# Patient Record
Sex: MALE | Race: WHITE | NOT HISPANIC OR LATINO | URBAN - METROPOLITAN AREA
[De-identification: names, ages, dates, MRNs, and addresses within clinical notes are randomized per-mention and may not be internally consistent; named-entity substitution may affect disease eponyms.]

---

## 2024-07-23 ENCOUNTER — INPATIENT (INPATIENT)
Facility: HOSPITAL | Age: 89
LOS: 7 days | Discharge: NURSING, REHAB OR LONG-TERM CARE | DRG: 864 | End: 2024-07-31
Attending: INTERNAL MEDICINE | Admitting: INTERNAL MEDICINE
Payer: MEDICARE

## 2024-07-23 VITALS
WEIGHT: 205.03 LBS | OXYGEN SATURATION: 94 % | HEART RATE: 76 BPM | TEMPERATURE: 103 F | HEIGHT: 70 IN | DIASTOLIC BLOOD PRESSURE: 74 MMHG | SYSTOLIC BLOOD PRESSURE: 128 MMHG | RESPIRATION RATE: 22 BRPM

## 2024-07-23 DIAGNOSIS — R50.9 FEVER, UNSPECIFIED: ICD-10-CM

## 2024-07-23 LAB
ALBUMIN SERPL ELPH-MCNC: 2.9 G/DL — LOW (ref 3.3–5)
ALP SERPL-CCNC: 83 U/L — SIGNIFICANT CHANGE UP (ref 30–120)
ALT FLD-CCNC: 11 U/L — SIGNIFICANT CHANGE UP (ref 10–60)
ANION GAP SERPL CALC-SCNC: 5 MMOL/L — SIGNIFICANT CHANGE UP (ref 5–17)
APPEARANCE UR: CLEAR — SIGNIFICANT CHANGE UP
APTT BLD: 31.6 SEC — SIGNIFICANT CHANGE UP (ref 24.5–35.6)
AST SERPL-CCNC: 35 U/L — SIGNIFICANT CHANGE UP (ref 10–40)
BASOPHILS # BLD AUTO: 0.04 K/UL — SIGNIFICANT CHANGE UP (ref 0–0.2)
BASOPHILS NFR BLD AUTO: 0.4 % — SIGNIFICANT CHANGE UP (ref 0–2)
BILIRUB SERPL-MCNC: 1 MG/DL — SIGNIFICANT CHANGE UP (ref 0.2–1.2)
BILIRUB UR-MCNC: NEGATIVE — SIGNIFICANT CHANGE UP
BUN SERPL-MCNC: 15 MG/DL — SIGNIFICANT CHANGE UP (ref 7–23)
CALCIUM SERPL-MCNC: 9.7 MG/DL — SIGNIFICANT CHANGE UP (ref 8.4–10.5)
CHLORIDE SERPL-SCNC: 97 MMOL/L — SIGNIFICANT CHANGE UP (ref 96–108)
CO2 SERPL-SCNC: 31 MMOL/L — SIGNIFICANT CHANGE UP (ref 22–31)
COLOR SPEC: YELLOW — SIGNIFICANT CHANGE UP
CREAT SERPL-MCNC: 1.09 MG/DL — SIGNIFICANT CHANGE UP (ref 0.5–1.3)
DIFF PNL FLD: NEGATIVE — SIGNIFICANT CHANGE UP
EGFR: 66 ML/MIN/1.73M2 — SIGNIFICANT CHANGE UP
EOSINOPHIL # BLD AUTO: 0.05 K/UL — SIGNIFICANT CHANGE UP (ref 0–0.5)
EOSINOPHIL NFR BLD AUTO: 0.5 % — SIGNIFICANT CHANGE UP (ref 0–6)
ERYTHROCYTE [SEDIMENTATION RATE] IN BLOOD: 64 MM/HR — HIGH (ref 0–9)
FLUAV AG NPH QL: SIGNIFICANT CHANGE UP
FLUBV AG NPH QL: SIGNIFICANT CHANGE UP
GLUCOSE SERPL-MCNC: 120 MG/DL — HIGH (ref 70–99)
GLUCOSE UR QL: NEGATIVE MG/DL — SIGNIFICANT CHANGE UP
HCT VFR BLD CALC: 42.6 % — SIGNIFICANT CHANGE UP (ref 39–50)
HGB BLD-MCNC: 14.1 G/DL — SIGNIFICANT CHANGE UP (ref 13–17)
IMM GRANULOCYTES NFR BLD AUTO: 0.3 % — SIGNIFICANT CHANGE UP (ref 0–0.9)
INR BLD: 1.19 RATIO — HIGH (ref 0.85–1.18)
KETONES UR-MCNC: NEGATIVE MG/DL — SIGNIFICANT CHANGE UP
LACTATE SERPL-SCNC: 2 MMOL/L — SIGNIFICANT CHANGE UP (ref 0.7–2)
LEUKOCYTE ESTERASE UR-ACNC: NEGATIVE — SIGNIFICANT CHANGE UP
LYMPHOCYTES # BLD AUTO: 0.92 K/UL — LOW (ref 1–3.3)
LYMPHOCYTES # BLD AUTO: 8.3 % — LOW (ref 13–44)
MCHC RBC-ENTMCNC: 29.8 PG — SIGNIFICANT CHANGE UP (ref 27–34)
MCHC RBC-ENTMCNC: 33.1 GM/DL — SIGNIFICANT CHANGE UP (ref 32–36)
MCV RBC AUTO: 90.1 FL — SIGNIFICANT CHANGE UP (ref 80–100)
MONOCYTES # BLD AUTO: 1.15 K/UL — HIGH (ref 0–0.9)
MONOCYTES NFR BLD AUTO: 10.4 % — SIGNIFICANT CHANGE UP (ref 2–14)
NEUTROPHILS # BLD AUTO: 8.88 K/UL — HIGH (ref 1.8–7.4)
NEUTROPHILS NFR BLD AUTO: 80.1 % — HIGH (ref 43–77)
NITRITE UR-MCNC: NEGATIVE — SIGNIFICANT CHANGE UP
NRBC # BLD: 0 /100 WBCS — SIGNIFICANT CHANGE UP (ref 0–0)
NT-PROBNP SERPL-SCNC: 256 PG/ML — SIGNIFICANT CHANGE UP (ref 0–450)
PH UR: 7 — SIGNIFICANT CHANGE UP (ref 5–8)
PLATELET # BLD AUTO: 262 K/UL — SIGNIFICANT CHANGE UP (ref 150–400)
POTASSIUM SERPL-MCNC: 3.9 MMOL/L — SIGNIFICANT CHANGE UP (ref 3.5–5.3)
POTASSIUM SERPL-SCNC: 3.9 MMOL/L — SIGNIFICANT CHANGE UP (ref 3.5–5.3)
PROT SERPL-MCNC: 7.6 G/DL — SIGNIFICANT CHANGE UP (ref 6–8.3)
PROT UR-MCNC: NEGATIVE MG/DL — SIGNIFICANT CHANGE UP
PROTHROM AB SERPL-ACNC: 12.9 SEC — SIGNIFICANT CHANGE UP (ref 9.5–13)
RAPID RVP RESULT: SIGNIFICANT CHANGE UP
RBC # BLD: 4.73 M/UL — SIGNIFICANT CHANGE UP (ref 4.2–5.8)
RBC # FLD: 14 % — SIGNIFICANT CHANGE UP (ref 10.3–14.5)
RSV RNA NPH QL NAA+NON-PROBE: SIGNIFICANT CHANGE UP
SARS-COV-2 RNA SPEC QL NAA+PROBE: SIGNIFICANT CHANGE UP
SARS-COV-2 RNA SPEC QL NAA+PROBE: SIGNIFICANT CHANGE UP
SODIUM SERPL-SCNC: 133 MMOL/L — LOW (ref 135–145)
SP GR SPEC: 1.02 — SIGNIFICANT CHANGE UP (ref 1–1.03)
UROBILINOGEN FLD QL: 1 MG/DL — SIGNIFICANT CHANGE UP (ref 0.2–1)
WBC # BLD: 11.07 K/UL — HIGH (ref 3.8–10.5)
WBC # FLD AUTO: 11.07 K/UL — HIGH (ref 3.8–10.5)

## 2024-07-23 PROCEDURE — 71250 CT THORAX DX C-: CPT | Mod: 26,MC

## 2024-07-23 PROCEDURE — 73562 X-RAY EXAM OF KNEE 3: CPT | Mod: 26,LT

## 2024-07-23 PROCEDURE — 71045 X-RAY EXAM CHEST 1 VIEW: CPT | Mod: 26

## 2024-07-23 PROCEDURE — 73700 CT LOWER EXTREMITY W/O DYE: CPT | Mod: 26,LT,MC

## 2024-07-23 PROCEDURE — 93010 ELECTROCARDIOGRAM REPORT: CPT

## 2024-07-23 PROCEDURE — 99285 EMERGENCY DEPT VISIT HI MDM: CPT | Mod: FS

## 2024-07-23 RX ORDER — VANCOMYCIN HYDROCHLORIDE 5 G/100ML
1000 INJECTION, POWDER, LYOPHILIZED, FOR SOLUTION INTRAVENOUS ONCE
Refills: 0 | Status: COMPLETED | OUTPATIENT
Start: 2024-07-23 | End: 2024-07-23

## 2024-07-23 RX ORDER — PIPERACILLIN SODIUM, TAZOBACTAM SODIUM 3; .375 G/15ML; G/15ML
3.38 INJECTION, POWDER, LYOPHILIZED, FOR SOLUTION INTRAVENOUS ONCE
Refills: 0 | Status: COMPLETED | OUTPATIENT
Start: 2024-07-23 | End: 2024-07-23

## 2024-07-23 RX ORDER — ACETAMINOPHEN 500 MG
1000 TABLET ORAL ONCE
Refills: 0 | Status: COMPLETED | OUTPATIENT
Start: 2024-07-23 | End: 2024-07-23

## 2024-07-23 RX ADMIN — Medication 400 MILLIGRAM(S): at 18:01

## 2024-07-23 RX ADMIN — PIPERACILLIN SODIUM, TAZOBACTAM SODIUM 3.38 GRAM(S): 3; .375 INJECTION, POWDER, LYOPHILIZED, FOR SOLUTION INTRAVENOUS at 21:50

## 2024-07-23 RX ADMIN — VANCOMYCIN HYDROCHLORIDE 250 MILLIGRAM(S): 5 INJECTION, POWDER, LYOPHILIZED, FOR SOLUTION INTRAVENOUS at 22:40

## 2024-07-23 RX ADMIN — PIPERACILLIN SODIUM, TAZOBACTAM SODIUM 200 GRAM(S): 3; .375 INJECTION, POWDER, LYOPHILIZED, FOR SOLUTION INTRAVENOUS at 21:07

## 2024-07-23 RX ADMIN — Medication 1000 MILLIGRAM(S): at 19:00

## 2024-07-23 NOTE — ED PROVIDER NOTE - OBJECTIVE STATEMENT
89-year-old male brought in by ambulance from [facility] due to left knee pain and swelling.  Patient was found to have a fever in the ER.  Patient notes he has chronically pain to the left knee.  Patient reports experiencing a cough.  Patient denies numbness, weakness, chest pain, shortness of breath, vomiting, diarrhea, or any other complaints.

## 2024-07-23 NOTE — ED ADULT TRIAGE NOTE - CHIEF COMPLAINT QUOTE
sent in for left knee pain and swelling but pt states has had it for while. pt weak and having trouble ambulating left knee swollen not red. denies sore throat. denies n/v/d. c/o slight cough

## 2024-07-23 NOTE — ED ADULT NURSE REASSESSMENT NOTE - NS ED NURSE REASSESS COMMENT FT1
Left knee tapped by Dr. Pace. Samples obtained and specimens walked to the lab by ED attending Dr. Garcia. Pt tolerated procedure well. Band-aid to site.
Pt out of bed to urinate, is confused, attempted to reorient pt.

## 2024-07-23 NOTE — ED PROVIDER NOTE - CLINICAL SUMMARY MEDICAL DECISION MAKING FREE TEXT BOX
Patient is an 89-year-old male who presents to the emergency room for flulike symptoms.  Past medical history of dementia hypothyroidism hypertension hyperlipidemia..  History is limited secondary to patient's dementia.  Per reports he presents with cough and left knee swelling.  He was found to be febrile on arrival.  On exam patient is lying in bed awake alert to person aware he is in hospital confused to year present events.  Normocephalic atraumatic pupils equal round and reactive heart is regular rate lungs clear to auscultation but does have a scattered wheeze that clears after a coughing episode.  Abdomen soft nontender nondistended.  Left knee effusion noted with no overlying cellulitis or increased warmth.  Patient is able to fully flex and extend knee but does report pain on flexion.  Sensation grossly intact bilateral lower extremities positive pedal pulses cap refill less than 2 seconds.  Patient is presenting to the emergency room febrile with a cough and left knee swelling.  Patient has full range of motion of the knee with no overlying cellulitis or suspicion for septic joint.  Will obtain screening septic workup check viral swab obtain imaging of the chest and knee begin antibiotics as indicated and monitor.  Ultimate clinical disposition will be pending results. Results of labs reviewed patient with a mild leukocytosis with elevated ESR.  CMP noted.  Lactate within normal.  Urine does not appear to be infected viral panel negative.  Independent review of EKG reveals a sinus rhythm with a first-degree AV block at a rate of 76 bpm.  Independent review of chest x-ray reveals no acute infiltrate.  Independent review of left knee x-ray reveals a suprapatellar effusion.  Independent review of CT imaging of the chest reveals no acute infiltrate.  Independent review of CT imaging of the knee reveals no acute fracture or large knee effusion.  As there is no other source of infection orthopedics was consulted.  Orthopedics team examined patient perform tap at bedside.  Fluid sent for results.  Antibiotics ordered.

## 2024-07-23 NOTE — ED PROVIDER NOTE - DIFFERENTIAL DIAGNOSIS
Differential Diagnosis Patient is presenting to the emergency room febrile with a cough and left knee swelling.  Patient has full range of motion of the knee with no overlying cellulitis or suspicion for septic joint.  Will obtain screening septic workup check viral swab obtain imaging of the chest and knee begin antibiotics as indicated and monitor.  Ultimate clinical disposition will be pending results.

## 2024-07-23 NOTE — ED ADULT NURSE NOTE - NSFALLHARMRISKINTERV_ED_ALL_ED

## 2024-07-23 NOTE — ED PROVIDER NOTE - WHICH SHOWED
Independent review of EKG reveals a sinus rhythm with a first-degree AV block at a rate of 76 bpm.  Independent review of chest x-ray reveals no acute infiltrate.  Independent review of left knee x-ray reveals a suprapatellar effusion.  Independent review of CT imaging of the chest reveals no acute infiltrate.  Independent review of CT imaging of the knee reveals no acute fracture or large knee effusion.

## 2024-07-23 NOTE — ED PROVIDER NOTE - WR ORDER ID 2
[facility] Pregnancy And Lactation Text: There is insufficient data to evaluate drug-associated risk for major birth defects, miscarriage, or other adverse maternal or fetal outcomes.  There is a pregnancy registry that monitors pregnancy outcomes in pregnant persons exposed to the medication during pregnancy.  It is unknown if this medication is excreted in breast milk.  Do not breastfeed during treatment and for about 4 weeks after the last dose. [id number]

## 2024-07-23 NOTE — ED ADULT NURSE REASSESSMENT NOTE - NSFALLHARMRISKINTERV_ED_ALL_ED
Assistance OOB with selected safe patient handling equipment if applicable/Assistance with ambulation/Communicate risk of Fall with Harm to all staff, patient, and family/Monitor gait and stability/Monitor for mental status changes and reorient to person, place, and time, as needed/Move patient closer to nursing station/within visual sight of ED staff/Provide visual cue: red socks, yellow wristband, yellow gown, etc/Reinforce activity limits and safety measures with patient and family/Toileting schedule using arm’s reach rule for commode and bathroom/Use of alarms - bed, stretcher, chair and/or video monitoring/Bed in lowest position, wheels locked, appropriate side rails in place/Call bell, personal items and telephone in reach/Instruct patient to call for assistance before getting out of bed/chair/stretcher/Non-slip footwear applied when patient is off stretcher/[place] to call system/Physically safe environment - no spills, clutter or unnecessary equipment/Purposeful Proactive Rounding/Room/bathroom lighting operational, light cord in reach

## 2024-07-24 DIAGNOSIS — E03.9 HYPOTHYROIDISM, UNSPECIFIED: ICD-10-CM

## 2024-07-24 DIAGNOSIS — Z29.9 ENCOUNTER FOR PROPHYLACTIC MEASURES, UNSPECIFIED: ICD-10-CM

## 2024-07-24 DIAGNOSIS — I10 ESSENTIAL (PRIMARY) HYPERTENSION: ICD-10-CM

## 2024-07-24 DIAGNOSIS — E78.5 HYPERLIPIDEMIA, UNSPECIFIED: ICD-10-CM

## 2024-07-24 DIAGNOSIS — A41.9 SEPSIS, UNSPECIFIED ORGANISM: ICD-10-CM

## 2024-07-24 DIAGNOSIS — M00.9 PYOGENIC ARTHRITIS, UNSPECIFIED: ICD-10-CM

## 2024-07-24 LAB
ANION GAP SERPL CALC-SCNC: 7 MMOL/L — SIGNIFICANT CHANGE UP (ref 5–17)
B PERT IGG+IGM PNL SER: ABNORMAL
BASOPHILS # BLD AUTO: 0.03 K/UL — SIGNIFICANT CHANGE UP (ref 0–0.2)
BASOPHILS NFR BLD AUTO: 0.3 % — SIGNIFICANT CHANGE UP (ref 0–2)
BUN SERPL-MCNC: 16 MG/DL — SIGNIFICANT CHANGE UP (ref 7–23)
CALCIUM SERPL-MCNC: 9.7 MG/DL — SIGNIFICANT CHANGE UP (ref 8.4–10.5)
CHLORIDE SERPL-SCNC: 97 MMOL/L — SIGNIFICANT CHANGE UP (ref 96–108)
CO2 SERPL-SCNC: 30 MMOL/L — SIGNIFICANT CHANGE UP (ref 22–31)
COLOR FLD: YELLOW — SIGNIFICANT CHANGE UP
CREAT SERPL-MCNC: 0.97 MG/DL — SIGNIFICANT CHANGE UP (ref 0.5–1.3)
CRP SERPL-MCNC: 95 MG/L — HIGH
EGFR: 76 ML/MIN/1.73M2 — SIGNIFICANT CHANGE UP
EOSINOPHIL # BLD AUTO: 0.16 K/UL — SIGNIFICANT CHANGE UP (ref 0–0.5)
EOSINOPHIL NFR BLD AUTO: 1.4 % — SIGNIFICANT CHANGE UP (ref 0–6)
FLUID INTAKE SUBSTANCE CLASS: SIGNIFICANT CHANGE UP
GLUCOSE SERPL-MCNC: 107 MG/DL — HIGH (ref 70–99)
HCT VFR BLD CALC: 45.8 % — SIGNIFICANT CHANGE UP (ref 39–50)
HGB BLD-MCNC: 15 G/DL — SIGNIFICANT CHANGE UP (ref 13–17)
IMM GRANULOCYTES NFR BLD AUTO: 0.4 % — SIGNIFICANT CHANGE UP (ref 0–0.9)
LYMPHOCYTES # BLD AUTO: 1.34 K/UL — SIGNIFICANT CHANGE UP (ref 1–3.3)
LYMPHOCYTES # BLD AUTO: 11.8 % — LOW (ref 13–44)
LYMPHOCYTES # FLD: 1 % — SIGNIFICANT CHANGE UP
MCHC RBC-ENTMCNC: 29.5 PG — SIGNIFICANT CHANGE UP (ref 27–34)
MCHC RBC-ENTMCNC: 32.8 GM/DL — SIGNIFICANT CHANGE UP (ref 32–36)
MCV RBC AUTO: 90 FL — SIGNIFICANT CHANGE UP (ref 80–100)
MONOCYTES # BLD AUTO: 1.27 K/UL — HIGH (ref 0–0.9)
MONOCYTES NFR BLD AUTO: 11.2 % — SIGNIFICANT CHANGE UP (ref 2–14)
MONOS+MACROS # FLD: 39 % — SIGNIFICANT CHANGE UP
NEUTROPHILS # BLD AUTO: 8.49 K/UL — HIGH (ref 1.8–7.4)
NEUTROPHILS NFR BLD AUTO: 74.9 % — SIGNIFICANT CHANGE UP (ref 43–77)
NEUTROPHILS-BODY FLUID: 60 % — SIGNIFICANT CHANGE UP
NRBC # BLD: 0 /100 WBCS — SIGNIFICANT CHANGE UP (ref 0–0)
PLATELET # BLD AUTO: 252 K/UL — SIGNIFICANT CHANGE UP (ref 150–400)
POTASSIUM SERPL-MCNC: 3.5 MMOL/L — SIGNIFICANT CHANGE UP (ref 3.5–5.3)
POTASSIUM SERPL-SCNC: 3.5 MMOL/L — SIGNIFICANT CHANGE UP (ref 3.5–5.3)
RBC # BLD: 5.09 M/UL — SIGNIFICANT CHANGE UP (ref 4.2–5.8)
RBC # FLD: 13.9 % — SIGNIFICANT CHANGE UP (ref 10.3–14.5)
RCV VOL RI: HIGH /UL (ref 0–0)
SODIUM SERPL-SCNC: 134 MMOL/L — LOW (ref 135–145)
SYNOVIAL CRYSTALS CLARITY: ABNORMAL
SYNOVIAL CRYSTALS COLOR: YELLOW
SYNOVIAL CRYSTALS ID: ABNORMAL
SYNOVIAL CRYSTALS TUBE: SIGNIFICANT CHANGE UP
TOTAL NUCLEATED CELL COUNT, BODY FLUID: SIGNIFICANT CHANGE UP /UL
TUBE TYPE: SIGNIFICANT CHANGE UP
WBC # BLD: 11.34 K/UL — HIGH (ref 3.8–10.5)
WBC # FLD AUTO: 11.34 K/UL — HIGH (ref 3.8–10.5)

## 2024-07-24 PROCEDURE — 99223 1ST HOSP IP/OBS HIGH 75: CPT | Mod: AI

## 2024-07-24 RX ORDER — VANCOMYCIN HYDROCHLORIDE 5 G/100ML
1750 INJECTION, POWDER, LYOPHILIZED, FOR SOLUTION INTRAVENOUS EVERY 12 HOURS
Refills: 0 | Status: DISCONTINUED | OUTPATIENT
Start: 2024-07-24 | End: 2024-07-24

## 2024-07-24 RX ORDER — ACETAMINOPHEN 500 MG
650 TABLET ORAL EVERY 6 HOURS
Refills: 0 | Status: DISCONTINUED | OUTPATIENT
Start: 2024-07-24 | End: 2024-07-31

## 2024-07-24 RX ORDER — METOPROLOL TARTRATE 100 MG
50 TABLET ORAL DAILY
Refills: 0 | Status: DISCONTINUED | OUTPATIENT
Start: 2024-07-24 | End: 2024-07-30

## 2024-07-24 RX ORDER — LEVOTHYROXINE SODIUM 175 MCG
1 TABLET ORAL
Refills: 0 | DISCHARGE

## 2024-07-24 RX ORDER — ENOXAPARIN SODIUM 120 MG/.8ML
40 INJECTION SUBCUTANEOUS EVERY 24 HOURS
Refills: 0 | Status: DISCONTINUED | OUTPATIENT
Start: 2024-07-24 | End: 2024-07-31

## 2024-07-24 RX ORDER — GUAIFENESIN 100 MG/5ML
200 SOLUTION ORAL EVERY 6 HOURS
Refills: 0 | Status: DISCONTINUED | OUTPATIENT
Start: 2024-07-24 | End: 2024-07-31

## 2024-07-24 RX ORDER — LEVOTHYROXINE SODIUM 175 MCG
25 TABLET ORAL DAILY
Refills: 0 | Status: DISCONTINUED | OUTPATIENT
Start: 2024-07-24 | End: 2024-07-31

## 2024-07-24 RX ADMIN — Medication 20 MILLIGRAM(S): at 21:58

## 2024-07-24 RX ADMIN — ENOXAPARIN SODIUM 40 MILLIGRAM(S): 120 INJECTION SUBCUTANEOUS at 12:49

## 2024-07-24 RX ADMIN — VANCOMYCIN HYDROCHLORIDE 250 MILLIGRAM(S): 5 INJECTION, POWDER, LYOPHILIZED, FOR SOLUTION INTRAVENOUS at 09:33

## 2024-07-24 RX ADMIN — Medication 100 MILLIGRAM(S): at 06:25

## 2024-07-24 NOTE — H&P ADULT - NSHPPHYSICALEXAM_GEN_ALL_CORE
-    Vital Signs Last 24 Hrs  T(C): 36.8 (24 Jul 2024 06:00), Max: 39.6 (23 Jul 2024 16:21)  T(F): 98.3 (24 Jul 2024 06:00), Max: 103.2 (23 Jul 2024 16:21)  HR: 61 (24 Jul 2024 06:00) (57 - 76)  BP: 119/68 (24 Jul 2024 06:00) (103/59 - 128/74)  BP(mean): --  RR: 18 (24 Jul 2024 06:00) (18 - 22)  SpO2: 94% (24 Jul 2024 06:00) (94% - 94%)    Parameters below as of 24 Jul 2024 06:00  Patient On (Oxygen Delivery Method): room air        PHYSICAL EXAM:  		  GENERAL: NAD, well-groomed, well-developed.  HEAD:  Atraumatic, Norm cephalic.  EYES: PERRLA, conjunctiva clear.  ENMT: no nasal discharge, MMM.   NECK: Supple, No JVD.  NERVOUS SYSTEM:  Alert & oriented X2, neurologically intact grossly.  CHEST/LUNG: Fair air entry B/L, no rales, rhonchi, or wheezing.  HEART: Normal S1 & S2, no murmurs, or extra sounds.  ABDOMEN: Soft, non-tender, non-distended; bowel sounds present, no palpable masses or organomegaly.  EXTREMITIES:  No clubbing, cyanosis, or edema, mildly swollen left knee without erythema or warmth, (+) fluctuation, right knee is normal.  VASCULAR: 2+ radial, DPA / PTA pulses B/L.  SKIN: No rashes or lesions.  PSYCH: normal affect & behavior.

## 2024-07-24 NOTE — PROGRESS NOTE ADULT - SUBJECTIVE AND OBJECTIVE BOX
Date/Time Patient Seen:  		  Referring MD:   Data Reviewed	       Patient is a 89y old  Male who presents with a chief complaint of     Subjective/HPI     PAST MEDICAL & SURGICAL HISTORY:        Medication list         MEDICATIONS  (STANDING):  cefTRIAXone   IVPB      vancomycin  IVPB 1750 milliGRAM(s) IV Intermittent every 12 hours    MEDICATIONS  (PRN):         Vitals log        ICU Vital Signs Last 24 Hrs  T(C): 36.7 (23 Jul 2024 23:45), Max: 39.6 (23 Jul 2024 16:21)  T(F): 98.1 (23 Jul 2024 23:45), Max: 103.2 (23 Jul 2024 16:21)  HR: 57 (23 Jul 2024 23:45) (57 - 76)  BP: 103/59 (23 Jul 2024 23:45) (103/59 - 128/74)  BP(mean): --  ABP: --  ABP(mean): --  RR: 18 (23 Jul 2024 23:45) (18 - 22)  SpO2: 94% (23 Jul 2024 23:45) (94% - 94%)    O2 Parameters below as of 23 Jul 2024 23:45  Patient On (Oxygen Delivery Method): room air                 Input and Output:  I&O's Detail      Lab Data                        14.1   11.07 )-----------( 262      ( 23 Jul 2024 16:58 )             42.6     07-23    133<L>  |  97  |  15  ----------------------------<  120<H>  3.9   |  31  |  1.09    Ca    9.7      23 Jul 2024 16:58    TPro  7.6  /  Alb  2.9<L>  /  TBili  1.0  /  DBili  x   /  AST  35  /  ALT  11  /  AlkPhos  83  07-23            Review of Systems	      Objective     Physical Examination    heart s1s2  lung dc BS  head nc      Pertinent Lab findings & Imaging      Aj:  NO   Adequate UO     I&O's Detail           Discussed with:     Cultures:	        Radiology

## 2024-07-24 NOTE — H&P ADULT - ASSESSMENT
88 y/o M with PMH of HTN, Dyslipidemia, and Hypothyroidism presented with left knee swelling & pain associated with generalized weakness.

## 2024-07-24 NOTE — CARE COORDINATION ASSESSMENT. - MET/SPOKE WITH
son Mauro and Alize from [facility] [phone]/patient/son/caregiver

## 2024-07-24 NOTE — H&P ADULT - PROBLEM SELECTOR PLAN 2
ML 2ry to the above, normal serum lactic acid level, empirical antibiotic therapy as stated above, monitor I & O, ID consulted.

## 2024-07-24 NOTE — H&P ADULT - PROBLEM SELECTOR PLAN 1
seen by ortho at the ED, arthrocentesis was performed, was started empirically on Vancomycin & Zosyn after cultures were obtained by ED team, switched to Ceftriaxone & Vancomycin, monitor Vancomycin trough level, pain control, Tylenol PRN for fever, ID consult with Dr. Contreras was called, f/u synovial aspirate w/u results.

## 2024-07-24 NOTE — H&P ADULT - HISTORY OF PRESENT ILLNESS
This is an 89 y/o M with PMH of HTN, Dyslipidemia, and Hypothyroidism who presented from [facility] with left knee swelling & pain associated with generalized weakness. No reported fever or chills prior to presentation, denies knee trauma or falls, states that he has the above complaints "for a while". At the ED he was found with fever of 103.2 F with evidence of sepsis, seen by Ortho, an arthrocentesis was performed, and patient was started on antibiotics. Currently denies any pain.

## 2024-07-24 NOTE — CONSULT NOTE ADULT - ASSESSMENT
89-year-old male brought in by ambulance from [facility] due to left knee pain and swelling.  Patient was found to have a fever in the ER.  Patient notes he has chronically pain to the left knee.  Patient reports experiencing a cough.
The patient is an 89 year old male with a history of HTN, HL, hypothyroidism who presents with shortness of breath, cough.    Plan:  - ECG with sinus rhythm and atrial ectopy  - CT chest with no PNA  -   - Continue metoprolol succinate 50 mg daily  - Continue simvastatin 20 mg daily  - Hold triamterene-HCTZ for now  - Knee aspiration results pending  - ID eval
XR negative apart from arthritic changes.     No source of fever. I performed a L knee aspiration under sterile conditions. He tolerated this well. 40 cc of turbid yellow fluid. No magen pus. Send for cell count, c&s, crystals.         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   PT/OT to mobilize    Please page me if any acute changes.

## 2024-07-24 NOTE — CARE COORDINATION ASSESSMENT. - NSCAREPROVIDERS_GEN_ALL_CORE_FT
CARE PROVIDERS:  Accepting Physician: Ej Ingram  Administration: Lit Gibbs  Administration: Alfredo Perrin  Admitting: Ej Ingram  Attending: Ej Ingram  Case Management: Cassandra Kearns  Case Management: Eryn Gonzalez  Consultant: Holly Contreras  Consultant: Ok Metz  Consultant: Raghavendra Delnua  Consultant: Nirmal Andrade  ED ACP: Gallo Griffin  ED Attending: Elizabeth Garcia  ED Nurse: Ashwin Bañuelos  Infection Control: Shara Elizabeth  Nurse: Liliana Zabala  Nurse: Ashwin Bañuelos  Ordered: Physician, Ordering  Override: Liliana Zabala  Primary Team: Miriam Hamm  Primary Team: Tori Granados  Registered Dietitian: Miriam Zarate  Team: [facility] Hospitalists, Team

## 2024-07-24 NOTE — PATIENT PROFILE ADULT - FUNCTIONAL ASSESSMENT - BASIC MOBILITY 6.
3-calculated by average/Not able to assess (calculate score using [facility] averaging method)

## 2024-07-24 NOTE — H&P ADULT - PROBLEM SELECTOR PLAN 4
controlled on the lower side, hold Triamterene/ HCTZ for now, continue Metoprolol succinate with hold parameters.

## 2024-07-24 NOTE — PROGRESS NOTE ADULT - ASSESSMENT
89-year-old male brought in by ambulance from [facility] due to left knee pain and swelling.  Patient was found to have a fever in the ER.  Patient notes he has chronically pain to the left knee.  Patient reports experiencing a cough.    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

## 2024-07-24 NOTE — CONSULT NOTE ADULT - SUBJECTIVE AND OBJECTIVE BOX
History of Present Illness: The patient is an 89 year old male with a history of HTN, HL, hypothyroidism who presents with shortness of breath, cough. This has been present for the last few weeks. He also noted left knee swelling and pain. He was found to be febrile to 103F in ED.    Past Medical/Surgical History:  HTN, HL, hypothyroidism     Medications:  Home Medications:  Levothroid 25 mcg (0.025 mg) oral tablet: 1 tab(s) orally once a day (24 Jul 2024 02:42)  Metoprolol Succinate ER 50 mg oral tablet, extended release: 1 tab(s) orally once a day (24 Jul 2024 02:42)  simvastatin 20 mg oral tablet: 1 tab(s) orally once a day (at bedtime) (24 Jul 2024 02:42)  triamterene-hydrochlorothiazide 37.5 mg-25 mg oral capsule: 1 cap(s) orally once a day (24 Jul 2024 02:43)      Family History: Non-contributory family history of premature cardiovascular atherosclerotic disease    Social History: No tobacco, alcohol or drug use    Review of Systems:  General: No fevers, chills, weight gain  Skin: No rashes, color changes  Cardiovascular: No chest pain, orthopnea  Respiratory: +shortness of breath, cough  Gastrointestinal: No nausea, abdominal pain  Genitourinary: No incontinence, pain with urination  Musculoskeletal: +pain, swelling, decreased range of motion  Neurological: No headache, weakness  Psychiatric: No depression, anxiety  Endocrine: No weight gain, increased thirst  All other systems are comprehensively negative.    Physical Exam:  Vitals:        Vital Signs Last 24 Hrs  T(C): 37.1 (24 Jul 2024 08:06), Max: 39.6 (23 Jul 2024 16:21)  T(F): 98.7 (24 Jul 2024 08:06), Max: 103.2 (23 Jul 2024 16:21)  HR: 60 (24 Jul 2024 08:06) (57 - 76)  BP: 143/70 (24 Jul 2024 08:06) (103/59 - 143/70)  BP(mean): --  RR: 18 (24 Jul 2024 08:06) (18 - 22)  SpO2: 99% (24 Jul 2024 08:06) (94% - 99%)  Parameters below as of 24 Jul 2024 08:06  Patient On (Oxygen Delivery Method): room air  General: NAD  HEENT: MMM  Neck: No JVD, no carotid bruit  Lungs: CTAB  CV: RRR, nl S1/S2, no M/R/G  Abdomen: S/NT/ND, +BS  Extremities: No LE edema, no cyanosis  Neuro: AAOx3, non-focal  Skin: No rash    Labs:                        14.1   11.07 )-----------( 262      ( 23 Jul 2024 16:58 )             42.6     07-23    133<L>  |  97  |  15  ----------------------------<  120<H>  3.9   |  31  |  1.09    Ca    9.7      23 Jul 2024 16:58    TPro  7.6  /  Alb  2.9<L>  /  TBili  1.0  /  DBili  x   /  AST  35  /  ALT  11  /  AlkPhos  83  07-23        PT/INR - ( 23 Jul 2024 16:58 )   PT: 12.9 sec;   INR: 1.19 ratio         PTT - ( 23 Jul 2024 16:58 )  PTT:31.6 sec    ECG/Telemetry: NSR, 1st deg AVB, PACs, normal axis, no ST abnormality    
89-year-old male brought in by ambulance from [facility] due to left knee pain and swelling.  Patient was found to have a fever in the ER.  Patient notes he has chronically pain to the left knee.  Patient reports experiencing a cough.  Patient denies numbness, weakness, chest pain, shortness of breath, vomiting, diarrhea, or any other complaints.      L knee effusion. tolerating AROM and PROM with slight pain. No tenderness. Tolerating axial loading but antalgic gait. NVI. No major skin changes  
Date/Time Patient Seen:  		  Referring MD:   Data Reviewed	       Patient is a 89y old  Male who presents with a chief complaint of     Subjective/HPI   89-year-old male brought in by ambulance from [facility] due to left knee pain and swelling.  Patient was found to have a fever in the ER.  Patient notes he has chronically pain to the left knee.  Patient reports experiencing a cough.  PAST MEDICAL & SURGICAL HISTORY:        Medication list         MEDICATIONS  (STANDING):    MEDICATIONS  (PRN):         Vitals log        ICU Vital Signs Last 24 Hrs  T(C): 39.6 (23 Jul 2024 16:21), Max: 39.6 (23 Jul 2024 16:21)  T(F): 103.2 (23 Jul 2024 16:21), Max: 103.2 (23 Jul 2024 16:21)  HR: 76 (23 Jul 2024 16:21) (76 - 76)  BP: 128/74 (23 Jul 2024 16:21) (128/74 - 128/74)  BP(mean): --  ABP: --  ABP(mean): --  RR: 22 (23 Jul 2024 16:21) (22 - 22)  SpO2: 94% (23 Jul 2024 16:21) (94% - 94%)    O2 Parameters below as of 23 Jul 2024 16:21  Patient On (Oxygen Delivery Method): room air         HIV:    HIV Test Questions:  · In accordance with [place] State law, we offer every patient who comes to our ED an HIV test. Would you like to be tested today?	Opt out    HEPATITIS C TEST QUESTIONS:    Hepatitis C Test Questions:  · In accordance with [place] State Law, we offer every patient a Hepatitis C test. Would you like to be tested today?	Opt out    PAST MEDICAL/SURGICAL/FAMILY/SOCIAL HISTORY:    Tobacco Usage:  · Tobacco Usage	Unknown if ever smoked    ALLERGIES AND HOME MEDICATIONS:   Allergies:        Allergies:  	No Known Allergies:     Home Medications:   * Outpatient Medication Status not yet specified  REVIEW OF SYSTEMS:    Review of Systems:  · CONSTITUTIONAL: no fever and no chills.  · MUSCULOSKELETAL: - - -  · Musculoskeletal [+]: JOINT PAIN  · Musculoskeletal [-]: no back pain, no calf pain, no limited range of motion, no neck pain  · SKIN: no abrasions, no jaundice, no lesions, no pruritis, and no rashes.  · NEURO: no loss of consciousness, no gait abnormality, no headache, no sensory deficits, and no weakness.  · ROS STATEMENT: all other ROS negative except as per HPI          Input and Output:  I&O's Detail      Lab Data                        14.1   11.07 )-----------( 262      ( 23 Jul 2024 16:58 )             42.6     07-23    133<L>  |  97  |  15  ----------------------------<  120<H>  3.9   |  31  |  1.09    Ca    9.7      23 Jul 2024 16:58    TPro  7.6  /  Alb  2.9<L>  /  TBili  1.0  /  DBili  x   /  AST  35  /  ALT  11  /  AlkPhos  83  07-23            Review of Systems	  cough  weakness  pain      Objective     Physical Examination    heart s1s2  lung dc bS  head nc  verbal  cn grossly int  weak  frail  elderly      Pertinent Lab findings & Imaging      Aj:  NO   Adequate UO     I&O's Detail           Discussed with:     Cultures:	        Radiology                            
  HPI:  88YO M PMH of HTN, Dyslipidemia, and Hypothyroidism resident of [facility] who presented to the hospital with c/o left knee swelling & pain associated with generalized weakness. No trauma or fall . Denies fever chills. Denies difficulty walking.  In ED he was found with fever of 103.2 F. Seen by Ortho, an arthrocentesis was performed.    Infectious Disease consult was called to evaluate pt and for antibiotic management.      Past Medical & Surgical Hx:  PAST MEDICAL & SURGICAL HISTORY:  No pertinent past medical history    No significant past surgical history      Social History--  EtOH: denies   Tobacco: denies   Drug Use: denies     FAMILY HISTORY:  Noncontributory    Allergies  No Known Allergies    Intolerances  NONE      Home Medications:  Levothroid 25 mcg (0.025 mg) oral tablet: 1 tab(s) orally once a day (24 Jul 2024 02:42)  Metoprolol Succinate ER 50 mg oral tablet, extended release: 1 tab(s) orally once a day (24 Jul 2024 02:42)  simvastatin 20 mg oral tablet: 1 tab(s) orally once a day (at bedtime) (24 Jul 2024 02:42)  triamterene-hydrochlorothiazide 37.5 mg-25 mg oral capsule: 1 cap(s) orally once a day (24 Jul 2024 02:43)    Current Inpatient Medications :    ANTIBIOTICS:   cefTRIAXone   IVPB      vancomycin  IVPB 1750 milliGRAM(s) IV Intermittent every 12 hours      OTHER RELEVANT MEDICATIONS :  acetaminophen     Tablet .. 650 milliGRAM(s) Oral every 6 hours PRN  enoxaparin Injectable 40 milliGRAM(s) SubCutaneous every 24 hours  guaiFENesin Oral Liquid (Sugar-Free) 200 milliGRAM(s) Oral every 6 hours PRN  levothyroxine 25 MICROGram(s) Oral daily  metoprolol succinate ER 50 milliGRAM(s) Oral daily  simvastatin 20 milliGRAM(s) Oral at bedtime      ROS:  CONSTITUTIONAL: + fever or chills  EYES:  Negative  blurry vision or double vision  CARDIOVASCULAR:  Negative for chest pain or palpitations  RESPIRATORY:  Negative for cough, wheezing, or SOB   GASTROINTESTINAL:  Negative for nausea, vomiting, diarrhea, constipation, or abdominal pain  GENITOURINARY:  Negative frequency, urgency , dysuria or hematuria   NEUROLOGIC:  No headache, confusion, dizziness, lightheadedness  All other systems were reviewed and are negative    Physical Exam:  Vital Signs Last 24 Hrs  T(C): 37.3 (24 Jul 2024 13:34), Max: 39.6 (23 Jul 2024 16:21)  T(F): 99.1 (24 Jul 2024 13:34), Max: 103.2 (23 Jul 2024 16:21)  HR: 82 (24 Jul 2024 13:34) (57 - 82)  BP: 128/66 (24 Jul 2024 13:34) (103/59 - 143/70)  RR: 18 (24 Jul 2024 13:34) (18 - 22)  SpO2: 95% (24 Jul 2024 13:34) (94% - 99%)    Parameters below as of 24 Jul 2024 13:34  Patient On (Oxygen Delivery Method): room air      Height (cm): 177.8 (07-23 @ 16:21)  Weight (kg): 93 (07-23 @ 16:21)  BMI (kg/m2): 29.4 (07-23 @ 16:21)  BSA (m2): 2.11 (07-23 @ 16:21)    General: well developed well nourished, in no acute distress  Neck: supple, trachea midline  Lungs: clear, no wheeze/rhonchi  Cardiovascular: regular rate and rhythm, S1 S2  Abdomen: soft, nontender, ND, bowel sounds normal  Neurological:  alert and oriented x3  Skin: no rash  Extremities: Left knee swelling no erythema no pain + FROM    Labs:                           15.0   11.34 )-----------( 252      ( 24 Jul 2024 11:44 )             45.8   07-24    134<L>  |  97  |  16  ----------------------------<  107<H>  3.5   |  30  |  0.97    Ca    9.7      24 Jul 2024 11:44    TPro  7.6  /  Alb  2.9<L>  /  TBili  1.0  /  DBili  x   /  AST  35  /  ALT  11  /  AlkPhos  83  07-23    RECENT CULTURES:          RADIOLOGY & ADDITIONAL STUDIES:    ACC: [id number] EXAM:  CT KNEE ONLY LT   ORDERED BY: SAIRA EM     PROCEDURE DATE:  07/23/2024          INTERPRETATION:  INDICATION:  pain, swelling    TECHNIQUE: CT of the left knee without contrast with axial, sagittal, and   coronal multiplanar reformats.    Comparison:Knee radiographs dated 7/23/2024    FINDINGS:    No acute displaced fracture or dislocation. Tricompartmental knee   osteoarthritis with areas of chondrocalcinosis and joint bodies within   the suprapatellar recess of the knee joint space. Enthesopathic changes   at the quadriceps tendon and insertion and patellar tendon origin.   Nonspecific large knee joint effusion. Trace Stearns's cyst.    Atherosclerotic calcifications are noted. Mild subcutaneous edema about   the anterior knee.    IMPRESSION:    No acute displaced fracture or dislocation.    Nonspecific large knee joint effusion. If there is clinical concern for   septic arthritis of the knee, knee joint aspiration should be performed.    Tricompartmental knee osteoarthritis with areas of chondrocalcinosis and   joint bodies.      ACC: [id number] EXAM:  CT CHEST   ORDERED BY: SAIRA EM     PROCEDURE DATE:  07/23/2024          INTERPRETATION:  Clinical information: Cough.    CT scan of the chest was obtained without administration of intravenous   contrast.    No hilaror mediastinal adenopathy is noted.    Heart is normal in size. Calcification of the coronary arteries noted. No   pericardial effusion is noted.    No endobronchial lesions are noted. Lungs are clear. No pleural effusions   are noted.    Below the diaphragm, visualized portions of the abdomen are unremarkable.    Degenerative changes of the spine are noted.    IMPRESSION: There is no pneumonia.    Assessment :   88YO M PMH of HTN, Dyslipidemia, and Hypothyroidism resident of [facility] who presented to the hospital with c/o left knee swelling & pain associated with generalized weakness. No trauma or fall . Denies fever chills. Denies difficulty walking.  In ED he was found with fever of 103.2 F. Seen by Ortho, an arthrocentesis was performed.  Rule out septic joint - low suspicion   Rule out Inflammatory Arthropathy    Plan :   Cont Rocephin   Dc Vanc  Fu cultures  Trend temps and cbc    Continue with present regiment .  Approptiate use of antibiotics and adverse effects reviewed.    > 45 minutes spent in direct patient care reviewing  the notes, lab data/ imaging , discussion with multidisciplinary team. All questions were addressed and answered to the best of my capacity .    Thank you for allowing me to participate in the care of your patient .      Holly Contreras MD  Infectious Disease  [phone]

## 2024-07-24 NOTE — CARE COORDINATION ASSESSMENT. - NSDCPLANSERVICES_GEN_ALL_CORE
Per H&P:   BIBA [facility] Sepsis / septic arthritis Left Knee swelling. T 103.   88 y/o M with PMH of HTN, Dyslipidemia, and Hypothyroidism presented with left knee swelling & pain associated with generalized weakness.   Septic arthritis. seen by ortho at the ED, arthrocentesis was performed, was started empirically on Vancomycin & Zosyn after cultures were obtained by ED team, switched to Ceftriaxone & Vancomycin, monitor Vancomycin trough level, pain control, Tylenol PRN for fever, ID consult with Dr. Contreras was called, f/u synovial aspirate w/u results./Home Care/Transportation

## 2024-07-24 NOTE — H&P ADULT - NSHPSOCIALHISTORY_GEN_ALL_CORE
-    , resides in [facility], non smoker, no ETOH or drug abuse.

## 2024-07-24 NOTE — CARE COORDINATION ASSESSMENT. - PRO ARRIVE FROM
CM met with pt. at bedside in ED.  Pt. A&Ox1, able to have a conversation about his things from his past like  [employer] in [place] for 2 years and being an .  With permission CM called [facility] [phone] CT left message for Alize RIVERA - await return call and called Son Mauro.      Son  pt. has been at [facility] [phone] about 10 months and is ambulatory without DME or assist.  Does not own any DME.  States at baseline he  has Dementia and has very poor recall of recent events.   states pt. may need transportation back to [facility] if he is not available to drive him on day of discharge.  CM provided CM contact information and put discharge planning resource folder at bedside with list of [facility] and [facility] if needed.  [facility] has their own [facility] that they are agreeable to using if needed.  Informed son we await a PT eval and pt. had a Left Knee aspiration and samples sent for culture to r/o septic knee and  pt. on IVAB.      Pt. is on the regular unit at [facility] [phone]  and will need a 2832E form completed by MD and  faxed to    [phone].  PCP Dr. Brody [phone]  Pharmacy is [facility] in [place] [phone]  [facility] if needed.  Await PT eval.  was on a No added salt diet   Hx HTN. HLD, Dementia./assisted living facility

## 2024-07-24 NOTE — H&P ADULT - NSHPREVIEWOFSYSTEMS_GEN_ALL_CORE
-    CONSTITUTIONAL: No reported fever or chills at the facilitty.  EYES: No eye pain, visual disturbances, or discharge.  ENMT:  No difficulty hearing, vertigo, sinus or throat pain.  NECK: No pain or stiffness.	  RESPIRATORY: (+) productive cough, no wheezing, or hemoptysis; No shortness of breath.  CARDIOVASCULAR: No chest pain, palpitations, dizziness, or leg swelling.  GASTROINTESTINAL: No abdominal pain, no nausea, vomiting, or hematemesis; No diarrhea or Change in bowel habits. No melena or hematochezia.  GENITOURINARY: No dysuria, frequency, hematuria, or incontinence.  NEUROLOGICAL: No headaches, focal muscle weakness, numbness, or tremors.  SKIN: No itching, burning or rashes.  MUSCULOSKELETAL: No joint swelling or pain.  PSYCHIATRIC: No depression, anxiety, or agitation.  HEME/LYMPH: No easy bruising, bleeding gums, or nose bleed.  ALLERGY AND IMMUNOLOGIC: No hives or eczema.

## 2024-07-24 NOTE — CONSULT NOTE ADULT - CONSULT REASON
L knee pain and swelling
Left knee swelling and pain rule out septic arthritis.
Shortness of breath, HTN
cough  weakness

## 2024-07-24 NOTE — PATIENT CHOICE NOTE. - NSPTCHOICENOTES_GEN_ALL_CORE
If needed will use [facility] [phone] in [facility]   [facility]

## 2024-07-24 NOTE — H&P ADULT - NSHPLABSRESULTS_GEN_ALL_CORE
-                          14.1   11.07 )-----------( 262      ( 23 Jul 2024 16:58 )             42.6       23 Jul 2024 16:58    133    |  97     |  15     ----------------------------<  120    3.9     |  31     |  1.09     Ca    9.7        23 Jul 2024 16:58    TPro  7.6    /  Alb  2.9    /  TBili  1.0    /  DBili  x      /  AST  35     /  ALT  11     /  AlkPhos  83     23 Jul 2024 16:58    LIVER FUNCTIONS - ( 23 Jul 2024 16:58 )  Alb: 2.9 g/dL / Pro: 7.6 g/dL / ALK PHOS: 83 U/L / ALT: 11 U/L / AST: 35 U/L / GGT: x           PT/INR - ( 23 Jul 2024 16:58 )   PT: 12.9 sec;   INR: 1.19 ratio    PTT - ( 23 Jul 2024 16:58 )  PTT:31.6 sec    Urinalysis Basic - ( 23 Jul 2024 16:58 )  Color: x / Appearance: x / SG: x / pH: x  Gluc: 120 mg/dL / Ketone: x  / Bili: x / Urobili: x   Blood: x / Protein: x / Nitrite: x   Leuk Esterase: x / RBC: x / WBC x   Sq Epi: x / Non Sq Epi: x / Bacteria: x    Pro-Brain Natriuretic Peptide (07.23.24 @ 16:58)   Pro-Brain Natriuretic Peptide: 256 pg/mL    Lactate, Blood (07.23.24 @ 16:58)   Lactate, Blood: 2.0 mmol/L    FluA/FluB/RSV/COVID PCR (07.23.24 @ 16:58)   SARS-CoV-2 Result: Not Detected.  Influenza A Resu Not Detected.  Influenza B Result: Not Detected.  Resp Syn Virus Result: Not Detected. -                          14.1   11.07 )-----------( 262      ( 23 Jul 2024 16:58 )             42.6       23 Jul 2024 16:58    133    |  97     |  15     ----------------------------<  120    3.9     |  31     |  1.09     Ca    9.7        23 Jul 2024 16:58    TPro  7.6    /  Alb  2.9    /  TBili  1.0    /  DBili  x      /  AST  35     /  ALT  11     /  AlkPhos  83     23 Jul 2024 16:58    LIVER FUNCTIONS - ( 23 Jul 2024 16:58 )  Alb: 2.9 g/dL / Pro: 7.6 g/dL / ALK PHOS: 83 U/L / ALT: 11 U/L / AST: 35 U/L / GGT: x           PT/INR - ( 23 Jul 2024 16:58 )   PT: 12.9 sec;   INR: 1.19 ratio    PTT - ( 23 Jul 2024 16:58 )  PTT:31.6 sec    Urinalysis Basic - ( 23 Jul 2024 16:58 )  Color: x / Appearance: x / SG: x / pH: x  Gluc: 120 mg/dL / Ketone: x  / Bili: x / Urobili: x   Blood: x / Protein: x / Nitrite: x   Leuk Esterase: x / RBC: x / WBC x   Sq Epi: x / Non Sq Epi: x / Bacteria: x    Pro-Brain Natriuretic Peptide (07.23.24 @ 16:58)   Pro-Brain Natriuretic Peptide: 256 pg/mL    Lactate, Blood (07.23.24 @ 16:58)   Lactate, Blood: 2.0 mmol/L    FluA/FluB/RSV/COVID PCR (07.23.24 @ 16:58)   SARS-CoV-2 Result: Not Detected.  Influenza A Resu Not Detected.  Influenza B Result: Not Detected.  Resp Syn Virus Result: Not Detected.        CT KNEE ONLY LT     PROCEDURE DATE:  07/23/2024    Comparison:Knee radiographs dated 7/23/2024  FINDINGS:  No acute displaced fracture or dislocation. Tricompartmental knee osteoarthritis with areas of chondrocalcinosis and joint bodies within the suprapatellar recess of the knee joint space.   Enthesopathic changes at the quadriceps tendon and insertion and patellar tendon origin.   Nonspecific large knee joint effusion. Trace Stearns's cyst.  Atherosclerotic calcifications are noted. Mild subcutaneous edema about the anterior knee.  IMPRESSION:  No acute displaced fracture or dislocation.  Nonspecific large knee joint effusion. If there is clinical concern for septic arthritis of the knee, knee joint aspiration should be performed.  Tricompartmental knee osteoarthritis with areas of chondrocalcinosis and joint bodies.         CT CHEST    PROCEDURE DATE:  07/23/2024    INTERPRETATION:  Clinical information: Cough.  CT scan of the chest was obtained without administration of intravenous contrast.No hilaror mediastinal adenopathy is noted.  Heart is normal in size. Calcification of the coronary arteries noted. No pericardial effusion is noted.  No endobronchial lesions are noted. Lungs are clear. No pleural effusions are noted.  Below the diaphragm, visualized portions of the abdomen are unremarkable.  Degenerative changes of the spine are noted.  IMPRESSION: There is no pneumonia.        CXR:    As per my review shows elevated right susie diaphragm, normal cardiac shadow size, aortic arch calcifications, clear lung fields B/L, no pulmonary infiltrates, pleural effusion, or pneumothorax. Pending official report.         EKG:    As per my review shows SR at 75/min, with frequent PACs, 1st degree AV block, normal QTc intervals, normal QRS voltage, duration, and axis (+60), with normal transition, no ST-T abnormality.    -

## 2024-07-25 LAB
ALBUMIN SERPL ELPH-MCNC: 2.3 G/DL — LOW (ref 3.3–5)
ALP SERPL-CCNC: 73 U/L — SIGNIFICANT CHANGE UP (ref 30–120)
ALT FLD-CCNC: <10 U/L — LOW (ref 10–60)
ANION GAP SERPL CALC-SCNC: 10 MMOL/L — SIGNIFICANT CHANGE UP (ref 5–17)
ANION GAP SERPL CALC-SCNC: 3 MMOL/L — LOW (ref 5–17)
APPEARANCE UR: CLEAR — SIGNIFICANT CHANGE UP
AST SERPL-CCNC: 26 U/L — SIGNIFICANT CHANGE UP (ref 10–40)
BASOPHILS # BLD AUTO: 0.03 K/UL — SIGNIFICANT CHANGE UP (ref 0–0.2)
BASOPHILS NFR BLD AUTO: 0.3 % — SIGNIFICANT CHANGE UP (ref 0–2)
BILIRUB SERPL-MCNC: 0.8 MG/DL — SIGNIFICANT CHANGE UP (ref 0.2–1.2)
BILIRUB UR-MCNC: NEGATIVE — SIGNIFICANT CHANGE UP
BUN SERPL-MCNC: 15 MG/DL — SIGNIFICANT CHANGE UP (ref 7–23)
BUN SERPL-MCNC: 20 MG/DL — SIGNIFICANT CHANGE UP (ref 7–23)
CALCIUM SERPL-MCNC: 9.2 MG/DL — SIGNIFICANT CHANGE UP (ref 8.4–10.5)
CALCIUM SERPL-MCNC: 9.3 MG/DL — SIGNIFICANT CHANGE UP (ref 8.4–10.5)
CHLORIDE SERPL-SCNC: 100 MMOL/L — SIGNIFICANT CHANGE UP (ref 96–108)
CHLORIDE SERPL-SCNC: 99 MMOL/L — SIGNIFICANT CHANGE UP (ref 96–108)
CO2 SERPL-SCNC: 26 MMOL/L — SIGNIFICANT CHANGE UP (ref 22–31)
CO2 SERPL-SCNC: 29 MMOL/L — SIGNIFICANT CHANGE UP (ref 22–31)
COLOR SPEC: SIGNIFICANT CHANGE UP
CREAT SERPL-MCNC: 0.83 MG/DL — SIGNIFICANT CHANGE UP (ref 0.5–1.3)
CREAT SERPL-MCNC: 0.92 MG/DL — SIGNIFICANT CHANGE UP (ref 0.5–1.3)
DIFF PNL FLD: ABNORMAL
EGFR: 81 ML/MIN/1.73M2 — SIGNIFICANT CHANGE UP
EGFR: 85 ML/MIN/1.73M2 — SIGNIFICANT CHANGE UP
EOSINOPHIL # BLD AUTO: 0.2 K/UL — SIGNIFICANT CHANGE UP (ref 0–0.5)
EOSINOPHIL NFR BLD AUTO: 1.7 % — SIGNIFICANT CHANGE UP (ref 0–6)
EPI CELLS # UR: PRESENT
GLUCOSE SERPL-MCNC: 115 MG/DL — HIGH (ref 70–99)
GLUCOSE SERPL-MCNC: 96 MG/DL — SIGNIFICANT CHANGE UP (ref 70–99)
GLUCOSE UR QL: NEGATIVE MG/DL — SIGNIFICANT CHANGE UP
HCT VFR BLD CALC: 39.1 % — SIGNIFICANT CHANGE UP (ref 39–50)
HCT VFR BLD CALC: 40.7 % — SIGNIFICANT CHANGE UP (ref 39–50)
HGB BLD-MCNC: 12.9 G/DL — LOW (ref 13–17)
HGB BLD-MCNC: 13.4 G/DL — SIGNIFICANT CHANGE UP (ref 13–17)
IMM GRANULOCYTES NFR BLD AUTO: 0.4 % — SIGNIFICANT CHANGE UP (ref 0–0.9)
KETONES UR-MCNC: ABNORMAL MG/DL
LACTATE SERPL-SCNC: 1.8 MMOL/L — SIGNIFICANT CHANGE UP (ref 0.7–2)
LEUKOCYTE ESTERASE UR-ACNC: ABNORMAL
LYMPHOCYTES # BLD AUTO: 1.83 K/UL — SIGNIFICANT CHANGE UP (ref 1–3.3)
LYMPHOCYTES # BLD AUTO: 15.4 % — SIGNIFICANT CHANGE UP (ref 13–44)
MCHC RBC-ENTMCNC: 29.3 PG — SIGNIFICANT CHANGE UP (ref 27–34)
MCHC RBC-ENTMCNC: 29.7 PG — SIGNIFICANT CHANGE UP (ref 27–34)
MCHC RBC-ENTMCNC: 32.9 GM/DL — SIGNIFICANT CHANGE UP (ref 32–36)
MCHC RBC-ENTMCNC: 33 GM/DL — SIGNIFICANT CHANGE UP (ref 32–36)
MCV RBC AUTO: 89.1 FL — SIGNIFICANT CHANGE UP (ref 80–100)
MCV RBC AUTO: 90.1 FL — SIGNIFICANT CHANGE UP (ref 80–100)
MONOCYTES # BLD AUTO: 1.52 K/UL — HIGH (ref 0–0.9)
MONOCYTES NFR BLD AUTO: 12.8 % — SIGNIFICANT CHANGE UP (ref 2–14)
NEUTROPHILS # BLD AUTO: 8.23 K/UL — HIGH (ref 1.8–7.4)
NEUTROPHILS NFR BLD AUTO: 69.4 % — SIGNIFICANT CHANGE UP (ref 43–77)
NITRITE UR-MCNC: NEGATIVE — SIGNIFICANT CHANGE UP
NRBC # BLD: 0 /100 WBCS — SIGNIFICANT CHANGE UP (ref 0–0)
NRBC # BLD: 0 /100 WBCS — SIGNIFICANT CHANGE UP (ref 0–0)
PH UR: 5.5 — SIGNIFICANT CHANGE UP (ref 5–8)
PLATELET # BLD AUTO: 194 K/UL — SIGNIFICANT CHANGE UP (ref 150–400)
PLATELET # BLD AUTO: 262 K/UL — SIGNIFICANT CHANGE UP (ref 150–400)
POTASSIUM SERPL-MCNC: 3.9 MMOL/L — SIGNIFICANT CHANGE UP (ref 3.5–5.3)
POTASSIUM SERPL-MCNC: 4.1 MMOL/L — SIGNIFICANT CHANGE UP (ref 3.5–5.3)
POTASSIUM SERPL-SCNC: 3.9 MMOL/L — SIGNIFICANT CHANGE UP (ref 3.5–5.3)
POTASSIUM SERPL-SCNC: 4.1 MMOL/L — SIGNIFICANT CHANGE UP (ref 3.5–5.3)
PROT SERPL-MCNC: 6.8 G/DL — SIGNIFICANT CHANGE UP (ref 6–8.3)
PROT UR-MCNC: 30 MG/DL
RBC # BLD: 4.34 M/UL — SIGNIFICANT CHANGE UP (ref 4.2–5.8)
RBC # BLD: 4.57 M/UL — SIGNIFICANT CHANGE UP (ref 4.2–5.8)
RBC # FLD: 14.2 % — SIGNIFICANT CHANGE UP (ref 10.3–14.5)
RBC # FLD: 14.4 % — SIGNIFICANT CHANGE UP (ref 10.3–14.5)
RBC CASTS # UR COMP ASSIST: 12 /HPF — HIGH (ref 0–4)
SODIUM SERPL-SCNC: 131 MMOL/L — LOW (ref 135–145)
SODIUM SERPL-SCNC: 136 MMOL/L — SIGNIFICANT CHANGE UP (ref 135–145)
SP GR SPEC: 1.02 — SIGNIFICANT CHANGE UP (ref 1–1.03)
UROBILINOGEN FLD QL: 1 MG/DL — SIGNIFICANT CHANGE UP (ref 0.2–1)
WBC # BLD: 11.86 K/UL — HIGH (ref 3.8–10.5)
WBC # BLD: 9.71 K/UL — SIGNIFICANT CHANGE UP (ref 3.8–10.5)
WBC # FLD AUTO: 11.86 K/UL — HIGH (ref 3.8–10.5)
WBC # FLD AUTO: 9.71 K/UL — SIGNIFICANT CHANGE UP (ref 3.8–10.5)
WBC UR QL: 5 /HPF — SIGNIFICANT CHANGE UP (ref 0–5)

## 2024-07-25 PROCEDURE — 99232 SBSQ HOSP IP/OBS MODERATE 35: CPT

## 2024-07-25 RX ORDER — DEXTROSE MONOHYDRATE, SODIUM CHLORIDE, SODIUM LACTATE, CALCIUM CHLORIDE, MAGNESIUM CHLORIDE 1.5; 538; 448; 18.4; 5.08 G/100ML; MG/100ML; MG/100ML; MG/100ML; MG/100ML
1000 SOLUTION INTRAPERITONEAL ONCE
Refills: 0 | Status: COMPLETED | OUTPATIENT
Start: 2024-07-25 | End: 2024-07-25

## 2024-07-25 RX ORDER — COLCHICINE 0.6 MG/1
0.6 TABLET, FILM COATED ORAL
Refills: 0 | Status: DISCONTINUED | OUTPATIENT
Start: 2024-07-25 | End: 2024-07-31

## 2024-07-25 RX ADMIN — Medication 650 MILLIGRAM(S): at 01:43

## 2024-07-25 RX ADMIN — GUAIFENESIN 200 MILLIGRAM(S): 100 SOLUTION ORAL at 01:43

## 2024-07-25 RX ADMIN — Medication 50 MILLIGRAM(S): at 06:13

## 2024-07-25 RX ADMIN — DEXTROSE MONOHYDRATE, SODIUM CHLORIDE, SODIUM LACTATE, CALCIUM CHLORIDE, MAGNESIUM CHLORIDE 1000 MILLILITER(S): 1.5; 538; 448; 18.4; 5.08 SOLUTION INTRAPERITONEAL at 19:55

## 2024-07-25 RX ADMIN — Medication 100 MILLIGRAM(S): at 06:13

## 2024-07-25 RX ADMIN — ENOXAPARIN SODIUM 40 MILLIGRAM(S): 120 INJECTION SUBCUTANEOUS at 12:26

## 2024-07-25 RX ADMIN — Medication 650 MILLIGRAM(S): at 02:15

## 2024-07-25 RX ADMIN — Medication 20 MILLIGRAM(S): at 22:02

## 2024-07-25 RX ADMIN — Medication 650 MILLIGRAM(S): at 18:36

## 2024-07-25 RX ADMIN — COLCHICINE 0.6 MILLIGRAM(S): 0.6 TABLET, FILM COATED ORAL at 18:03

## 2024-07-25 RX ADMIN — Medication 25 MICROGRAM(S): at 06:14

## 2024-07-25 RX ADMIN — Medication 650 MILLIGRAM(S): at 18:06

## 2024-07-25 NOTE — PROGRESS NOTE ADULT - ASSESSMENT
89M HTN, HLD, Hypothyroidism admitted for Knee Swelling    Pseudogout   Confirmed S/P Arthrocentesis  Colchicine BID  Monitor BM  Antibiotics can be discontiued    HTN / HLD  Controlled  Metoprolol + HLD    Hypothyroidism  Synthroid    Diet  Regular    DVT Prophylaxis  Lovenox    Disposition   Discharge planning pending hospital course

## 2024-07-25 NOTE — PROGRESS NOTE ADULT - ASSESSMENT
89-year-old male brought in by ambulance from [facility] due to left knee pain and swelling.  Patient was found to have a fever in the ER.  Patient notes he has chronically pain to the left knee.  Patient reports experiencing a cough.    eval crystal arthropathy  eval septic joint  cough  OP  OA    ortho eval noted  pain assessment  cough rx regimen  cx - biomarkers  ct chest clear  knee imaging reviewed  monitor VS and Sat  GOC discussion

## 2024-07-25 NOTE — PROGRESS NOTE ADULT - SUBJECTIVE AND OBJECTIVE BOX
Subjective: Patient seen and examined. Knee effusion better.     MEDICATIONS  (STANDING):  cefTRIAXone   IVPB 2000 milliGRAM(s) IV Intermittent every 24 hours  cefTRIAXone   IVPB      enoxaparin Injectable 40 milliGRAM(s) SubCutaneous every 24 hours  levothyroxine 25 MICROGram(s) Oral daily  metoprolol succinate ER 50 milliGRAM(s) Oral daily  simvastatin 20 milliGRAM(s) Oral at bedtime    MEDICATIONS  (PRN):  acetaminophen     Tablet .. 650 milliGRAM(s) Oral every 6 hours PRN Temp greater or equal to 38C (100.4F), Mild Pain (1 - 3)  guaiFENesin Oral Liquid (Sugar-Free) 200 milliGRAM(s) Oral every 6 hours PRN Cough      Allergies    No Known Allergies    Intolerances        Vital Signs Last 24 Hrs  T(C): 37.6 (25 Jul 2024 13:45), Max: 37.6 (25 Jul 2024 13:45)  T(F): 99.6 (25 Jul 2024 13:45), Max: 99.6 (25 Jul 2024 13:45)  HR: 76 (25 Jul 2024 13:45) (66 - 84)  BP: 130/73 (25 Jul 2024 13:45) (120/60 - 138/64)  BP(mean): --  RR: 18 (25 Jul 2024 13:45) (18 - 19)  SpO2: 95% (25 Jul 2024 13:45) (93% - 95%)    Parameters below as of 25 Jul 2024 05:26  Patient On (Oxygen Delivery Method): room air        PHYSICAL EXAM:  GENERAL: NAD, well-groomed, well-developed  HEAD:  Atraumatic, Normocephalic  ENMT: Moist mucous membranes,   NECK: Supple, No JVD, Normal thyroid  NERVOUS SYSTEM:  All 4 extremities mobile, no gross sensory deficits.   CHEST/LUNG: Clear to auscultation bilaterally; No rales, rhonchi, wheezing, or rubs  HEART: Regular rate and rhythm; No murmurs, rubs, or gallops  ABDOMEN: Soft, Nontender, Nondistended; Bowel sounds present  EXTREMITIES:  2+ Peripheral Pulses, No clubbing, cyanosis, or edema      LABS:                        13.4   9.71  )-----------( 194      ( 25 Jul 2024 08:19 )             40.7     25 Jul 2024 08:19    136    |  100    |  15     ----------------------------<  96     3.9     |  26     |  0.83     Ca    9.3        25 Jul 2024 08:19      PT/INR - ( 23 Jul 2024 16:58 )   PT: 12.9 sec;   INR: 1.19 ratio         PTT - ( 23 Jul 2024 16:58 )  PTT:31.6 sec  Urinalysis Basic - ( 25 Jul 2024 08:19 )    Color: x / Appearance: x / SG: x / pH: x  Gluc: 96 mg/dL / Ketone: x  / Bili: x / Urobili: x   Blood: x / Protein: x / Nitrite: x   Leuk Esterase: x / RBC: x / WBC x   Sq Epi: x / Non Sq Epi: x / Bacteria: x      CAPILLARY BLOOD GLUCOSE          RADIOLOGY & ADDITIONAL TESTS:    Imaging Personally Reviewed:  [ ] YES     Consultant(s) Notes Reviewed:      Care Discussed with Consultants/Other Providers:    Advanced Directives: [ ] DNR  [ ] No feeding tube  [ ] MOLST in chart  [ ] MOLST completed today  [ ] Unknown

## 2024-07-25 NOTE — PROGRESS NOTE ADULT - SUBJECTIVE AND OBJECTIVE BOX
Date/Time Patient Seen:  		  Referring MD:   Data Reviewed	       Patient is a 89y old  Male who presents with a chief complaint of Left knee swelling & pain. (24 Jul 2024 13:59)      Subjective/HPI     PAST MEDICAL & SURGICAL HISTORY:  No pertinent past medical history    No significant past surgical history          Medication list         MEDICATIONS  (STANDING):  cefTRIAXone   IVPB 2000 milliGRAM(s) IV Intermittent every 24 hours  cefTRIAXone   IVPB      enoxaparin Injectable 40 milliGRAM(s) SubCutaneous every 24 hours  levothyroxine 25 MICROGram(s) Oral daily  metoprolol succinate ER 50 milliGRAM(s) Oral daily  simvastatin 20 milliGRAM(s) Oral at bedtime    MEDICATIONS  (PRN):  acetaminophen     Tablet .. 650 milliGRAM(s) Oral every 6 hours PRN Temp greater or equal to 38C (100.4F), Mild Pain (1 - 3)  guaiFENesin Oral Liquid (Sugar-Free) 200 milliGRAM(s) Oral every 6 hours PRN Cough         Vitals log        ICU Vital Signs Last 24 Hrs  T(C): 36.7 (25 Jul 2024 05:26), Max: 37.3 (24 Jul 2024 13:34)  T(F): 98 (25 Jul 2024 05:26), Max: 99.1 (24 Jul 2024 13:34)  HR: 66 (25 Jul 2024 05:26) (60 - 84)  BP: 136/86 (25 Jul 2024 05:26) (119/68 - 143/70)  BP(mean): --  ABP: --  ABP(mean): --  RR: 19 (25 Jul 2024 05:26) (18 - 19)  SpO2: 95% (25 Jul 2024 05:26) (93% - 99%)    O2 Parameters below as of 25 Jul 2024 05:26  Patient On (Oxygen Delivery Method): room air                 Input and Output:  I&O's Detail      Lab Data                        15.0   11.34 )-----------( 252      ( 24 Jul 2024 11:44 )             45.8     07-24    134<L>  |  97  |  16  ----------------------------<  107<H>  3.5   |  30  |  0.97    Ca    9.7      24 Jul 2024 11:44    TPro  7.6  /  Alb  2.9<L>  /  TBili  1.0  /  DBili  x   /  AST  35  /  ALT  11  /  AlkPhos  83  07-23            Review of Systems	      Objective     Physical Examination    heart s1s2  lung dc BS  head nc      Pertinent Lab findings & Imaging      Rocha:  NO   Adequate UO     I&O's Detail           Discussed with:     Cultures:	        Radiology

## 2024-07-25 NOTE — PROGRESS NOTE ADULT - SUBJECTIVE AND OBJECTIVE BOX
[facility] patient examined and chart reviewed.    INTERVAL HPI/ OVERNIGHT EVENTS:   Afebrile. No events.    PAST MEDICAL & SURGICAL HISTORY:  No pertinent past medical history      No significant past surgical history        For details regarding the patient's social history, family history, and other miscellaneous elements, please refer the initial infectious diseases consultation and/or the admitting history and physical examination for this admission.    ROS:  CONSTITUTIONAL:  Negative fever or chills  EYES:  Negative  blurry vision or double vision  CARDIOVASCULAR:  Negative for chest pain or palpitations  RESPIRATORY:  Negative for cough, wheezing, or SOB   GASTROINTESTINAL:  Negative for nausea, vomiting, diarrhea, constipation, or abdominal pain  GENITOURINARY:  Negative frequency, urgency or dysuria  NEUROLOGIC:  No headache, confusion, dizziness, lightheadedness  All other systems were reviewed and are negative     No Known Allergies      Current inpatient medications :    ANTIBIOTICS/RELEVANT:  cefTRIAXone   IVPB 2000 milliGRAM(s) IV Intermittent every 24 hours  cefTRIAXone   IVPB          acetaminophen     Tablet .. 650 milliGRAM(s) Oral every 6 hours PRN  enoxaparin Injectable 40 milliGRAM(s) SubCutaneous every 24 hours  guaiFENesin Oral Liquid (Sugar-Free) 200 milliGRAM(s) Oral every 6 hours PRN  levothyroxine 25 MICROGram(s) Oral daily  metoprolol succinate ER 50 milliGRAM(s) Oral daily  simvastatin 20 milliGRAM(s) Oral at bedtime      Objective:    07-24 @ 07:01  -  07-25 @ 07:00  --------------------------------------------------------  IN: 0 mL / OUT: 650 mL / NET: -650 mL      T(C): 37.6 (07-25-24 @ 13:45), Max: 37.6 (07-25-24 @ 13:45)  HR: 76 (07-25-24 @ 13:45) (66 - 84)  BP: 130/73 (07-25-24 @ 13:45) (120/60 - 138/64)  RR: 18 (07-25-24 @ 13:45) (18 - 19)  SpO2: 95% (07-25-24 @ 13:45) (93% - 95%)      Physical Exam:  General:  no acute distress  Neck: supple, trachea midline  Lungs: clear, no wheeze/rhonchi  Cardiovascular: regular rate and rhythm, S1 S2  Abdomen: soft, nontender,  bowel sounds normal  Neurological: alert and oriented x3  Skin: no rash  Extremities: Left knee swelling no erythema no pain      LABS:                          13.4   9.71  )-----------( 194      ( 25 Jul 2024 08:19 )             40.7       07-25    136  |  100  |  15  ----------------------------<  96  3.9   |  26  |  0.83    Ca    9.3      25 Jul 2024 08:19    TPro  7.6  /  Alb  2.9<L>  /  TBili  1.0  /  DBili  x   /  AST  35  /  ALT  11  /  AlkPhos  83  07-23      PT/INR - ( 23 Jul 2024 16:58 )   PT: 12.9 sec;   INR: 1.19 ratio         PTT - ( 23 Jul 2024 16:58 )  PTT:31.6 sec    Cell Count, Body Fluid (07.23.24 @ 20:50)    Tube Type: Lavender   Fluid Type: Other   Body Fluid Appearance: Turbid   Color - Body Fluid: Yellow   Total Nucleated Cell Count, Body Fluid: 79872: Reference Ranges:  Synovial Fluid  Total nucleated cells < 150 cells/uL  Neutrophils <25%  Lymphocytes 10-15%  Monocytes/Macrophages </=70%  Other parameters- No established reference ranges.  Bronchoalveolar lavage  Macrophages >85%  Lymphocytes 10-15%  Neutrophils <3%  Eosinophils <1%  Other parameters- No established reference ranges.  Peritoneal/pleural/pericardial fluid/other body fluid types  No established reference ranges. /uL   Total RBC Count: 27713 /uL   Neutrophils-Body Fluid: 60 %   BF Lymphocytes: 1 %   Monocyte/Macrophage Count - Body Fluid: 39 %    Culture - Joint (collected 23 Jul 2024 20:50)  Crystals, Synovial Fluid (07.23.24 @ 20:50)    Synovial Crystals Clarity: Turbid   Synovial Crystals Tube: Red Top Tube   Synovial Crystals Color: Yellow   Synovial Crystals Id: Crystals Present Intracellular CPPD (calcium pyrophosphate) present.      MICROBIOLOGY:    Source: Knee  Gram Stain (24 Jul 2024 20:52):    Moderate polymorphonuclear leukocytes per low power field    No organisms seen    Culture - Blood (collected 23 Jul 2024 16:58)  Source: .Blood Blood-Peripheral  Preliminary Report (25 Jul 2024 01:03):    No growth at 24 hours    Culture - Blood (collected 23 Jul 2024 16:58)  Source: .Blood Blood-Peripheral  Preliminary Report (25 Jul 2024 01:03):    No growth at 24 hours      RADIOLOGY & ADDITIONAL STUDIES:  ACC: [id number] EXAM:  CT KNEE ONLY LT   ORDERED BY: SAIRA EM     PROCEDURE DATE:  07/23/2024          INTERPRETATION:  INDICATION:  pain, swelling    TECHNIQUE: CT of the left knee without contrast with axial, sagittal, and   coronal multiplanar reformats.    Comparison:Knee radiographs dated 7/23/2024    FINDINGS:    No acute displaced fracture or dislocation. Tricompartmental knee   osteoarthritis with areas of chondrocalcinosis and joint bodies within   the suprapatellar recess of the knee joint space. Enthesopathic changes   at the quadriceps tendon and insertion and patellar tendon origin.   Nonspecific large knee joint effusion. Trace Stearns's cyst.    Atherosclerotic calcifications are noted. Mild subcutaneous edema about   the anterior knee.    IMPRESSION:    No acute displaced fracture or dislocation.    Nonspecific large knee joint effusion. If there is clinical concern for   septic arthritis of the knee, knee joint aspiration should be performed.    Tricompartmental knee osteoarthritis with areas of chondrocalcinosis and   joint bodies.      ACC: [id number] EXAM:  CT CHEST   ORDERED BY: SAIRA EM     PROCEDURE DATE:  07/23/2024          INTERPRETATION:  Clinical information: Cough.    CT scan of the chest was obtained without administration of intravenous   contrast.    No hilaror mediastinal adenopathy is noted.    Heart is normal in size. Calcification of the coronary arteries noted. No   pericardial effusion is noted.    No endobronchial lesions are noted. Lungs are clear. No pleural effusions   are noted.    Below the diaphragm, visualized portions of the abdomen are unremarkable.    Degenerative changes of the spine are noted.    IMPRESSION: There is no pneumonia.    Assessment :   89YO M PMH of HTN, Dyslipidemia, and Hypothyroidism resident of [facility] who presented to the hospital with c/o left knee swelling & pain associated with generalized weakness. Denies difficulty walking.  In ED he was found with fever of 103.2 F. Seen by Ortho, an arthrocentesis was performed.  Knee aspirate +Intracellular CPPD (calcium pyrophosphate) present- Likely pseudogout   Low suspicion for septic joint     Plan :   Cont Rocephin   If fluid culture No growth x 72 hours dc Antibiotics  Fu cultures  Trend temps and cbc  Pulm toileting  Asp precautions    Continue with present regiment.  Appropriate use of antibiotics and adverse effects reviewed.      > 35 minutes were spent in direct patient care reviewing notes, medications ,labs data/ imaging , discussion with multidisciplinary team.    Thank you for allowing me to participate in care of your patient .    Holly Contreras MD  Infectious Disease  [phone]      [facility] patient examined and chart reviewed.    INTERVAL HPI/ OVERNIGHT EVENTS:   Afebrile. Aj placed this am sec AUR.    PAST MEDICAL & SURGICAL HISTORY:  No pertinent past medical history      No significant past surgical history        For details regarding the patient's social history, family history, and other miscellaneous elements, please refer the initial infectious diseases consultation and/or the admitting history and physical examination for this admission.    ROS:  CONSTITUTIONAL:  Negative fever or chills  EYES:  Negative  blurry vision or double vision  CARDIOVASCULAR:  Negative for chest pain or palpitations  RESPIRATORY:  Negative for cough, wheezing, or SOB   GASTROINTESTINAL:  Negative for nausea, vomiting, diarrhea, constipation, or abdominal pain  GENITOURINARY:  Negative frequency, urgency or dysuria  NEUROLOGIC:  No headache, confusion, dizziness, lightheadedness  All other systems were reviewed and are negative     No Known Allergies      Current inpatient medications :    ANTIBIOTICS/RELEVANT:  cefTRIAXone   IVPB 2000 milliGRAM(s) IV Intermittent every 24 hours  cefTRIAXone   IVPB          acetaminophen     Tablet .. 650 milliGRAM(s) Oral every 6 hours PRN  enoxaparin Injectable 40 milliGRAM(s) SubCutaneous every 24 hours  guaiFENesin Oral Liquid (Sugar-Free) 200 milliGRAM(s) Oral every 6 hours PRN  levothyroxine 25 MICROGram(s) Oral daily  metoprolol succinate ER 50 milliGRAM(s) Oral daily  simvastatin 20 milliGRAM(s) Oral at bedtime      Objective:    07-24 @ 07:01  -  07-25 @ 07:00  --------------------------------------------------------  IN: 0 mL / OUT: 650 mL / NET: -650 mL      T(C): 37.6 (07-25-24 @ 13:45), Max: 37.6 (07-25-24 @ 13:45)  HR: 76 (07-25-24 @ 13:45) (66 - 84)  BP: 130/73 (07-25-24 @ 13:45) (120/60 - 138/64)  RR: 18 (07-25-24 @ 13:45) (18 - 19)  SpO2: 95% (07-25-24 @ 13:45) (93% - 95%)      Physical Exam:  General:  no acute distress  Neck: supple, trachea midline  Lungs: clear, no wheeze/rhonchi  Cardiovascular: regular rate and rhythm, S1 S2  Abdomen: soft, nontender,  bowel sounds normal  Neurological: alert and oriented x3  Skin: no rash  Extremities: Left knee swelling no erythema no pain      LABS:                          13.4   9.71  )-----------( 194      ( 25 Jul 2024 08:19 )             40.7       07-25    136  |  100  |  15  ----------------------------<  96  3.9   |  26  |  0.83    Ca    9.3      25 Jul 2024 08:19    TPro  7.6  /  Alb  2.9<L>  /  TBili  1.0  /  DBili  x   /  AST  35  /  ALT  11  /  AlkPhos  83  07-23      PT/INR - ( 23 Jul 2024 16:58 )   PT: 12.9 sec;   INR: 1.19 ratio         PTT - ( 23 Jul 2024 16:58 )  PTT:31.6 sec    Cell Count, Body Fluid (07.23.24 @ 20:50)    Tube Type: Lavender   Fluid Type: Other   Body Fluid Appearance: Turbid   Color - Body Fluid: Yellow   Total Nucleated Cell Count, Body Fluid: 27269: Reference Ranges:  Synovial Fluid  Total nucleated cells < 150 cells/uL  Neutrophils <25%  Lymphocytes 10-15%  Monocytes/Macrophages </=70%  Other parameters- No established reference ranges.  Bronchoalveolar lavage  Macrophages >85%  Lymphocytes 10-15%  Neutrophils <3%  Eosinophils <1%  Other parameters- No established reference ranges.  Peritoneal/pleural/pericardial fluid/other body fluid types  No established reference ranges. /uL   Total RBC Count: 52206 /uL   Neutrophils-Body Fluid: 60 %   BF Lymphocytes: 1 %   Monocyte/Macrophage Count - Body Fluid: 39 %    Culture - Joint (collected 23 Jul 2024 20:50)  Crystals, Synovial Fluid (07.23.24 @ 20:50)    Synovial Crystals Clarity: Turbid   Synovial Crystals Tube: Red Top Tube   Synovial Crystals Color: Yellow   Synovial Crystals Id: Crystals Present Intracellular CPPD (calcium pyrophosphate) present.      MICROBIOLOGY:    Source: Knee  Gram Stain (24 Jul 2024 20:52):    Moderate polymorphonuclear leukocytes per low power field    No organisms seen    Culture - Blood (collected 23 Jul 2024 16:58)  Source: .Blood Blood-Peripheral  Preliminary Report (25 Jul 2024 01:03):    No growth at 24 hours    Culture - Blood (collected 23 Jul 2024 16:58)  Source: .Blood Blood-Peripheral  Preliminary Report (25 Jul 2024 01:03):    No growth at 24 hours      RADIOLOGY & ADDITIONAL STUDIES:  ACC: [id number] EXAM:  CT KNEE ONLY LT   ORDERED BY: SAIRA EM     PROCEDURE DATE:  07/23/2024          INTERPRETATION:  INDICATION:  pain, swelling    TECHNIQUE: CT of the left knee without contrast with axial, sagittal, and   coronal multiplanar reformats.    Comparison:Knee radiographs dated 7/23/2024    FINDINGS:    No acute displaced fracture or dislocation. Tricompartmental knee   osteoarthritis with areas of chondrocalcinosis and joint bodies within   the suprapatellar recess of the knee joint space. Enthesopathic changes   at the quadriceps tendon and insertion and patellar tendon origin.   Nonspecific large knee joint effusion. Trace Stearns's cyst.    Atherosclerotic calcifications are noted. Mild subcutaneous edema about   the anterior knee.    IMPRESSION:    No acute displaced fracture or dislocation.    Nonspecific large knee joint effusion. If there is clinical concern for   septic arthritis of the knee, knee joint aspiration should be performed.    Tricompartmental knee osteoarthritis with areas of chondrocalcinosis and   joint bodies.      ACC: [id number] EXAM:  CT CHEST   ORDERED BY: SAIRA EM     PROCEDURE DATE:  07/23/2024          INTERPRETATION:  Clinical information: Cough.    CT scan of the chest was obtained without administration of intravenous   contrast.    No hilaror mediastinal adenopathy is noted.    Heart is normal in size. Calcification of the coronary arteries noted. No   pericardial effusion is noted.    No endobronchial lesions are noted. Lungs are clear. No pleural effusions   are noted.    Below the diaphragm, visualized portions of the abdomen are unremarkable.    Degenerative changes of the spine are noted.    IMPRESSION: There is no pneumonia.    Assessment :   88YO M PMH of HTN, Dyslipidemia, and Hypothyroidism resident of [facility] who presented to the hospital with c/o left knee swelling & pain associated with generalized weakness. Denies difficulty walking.  In ED he was found with fever of 103.2 F. Seen by Ortho, an arthrocentesis was performed.  Knee aspirate +Intracellular CPPD (calcium pyrophosphate) present- Likely pseudogout   Low suspicion for septic joint   Rocha placed this am sec AUR.    Plan :   Cont Rocephin   If fluid culture No growth dc Antibiotics  Fu cultures  Rocha per primary team  Trend temps and cbc  Pulm toileting  Asp precautions    Continue with present regiment.  Appropriate use of antibiotics and adverse effects reviewed.      > 35 minutes were spent in direct patient care reviewing notes, medications ,labs data/ imaging , discussion with multidisciplinary team.    Thank you for allowing me to participate in care of your patient .    Holly Contreras MD  Infectious Disease  [phone]

## 2024-07-25 NOTE — CASE MANAGEMENT PROGRESS NOTE - NSCMPROGRESSNOTE_GEN_ALL_CORE
RN/CM notified during Interdisciplinary rounds that pt remains acute, had urinary retention issues that required FR#16 garcia insertion on 07/24/2024. Pt on IV Rocephin. Pt is a resident of [facility] living facility [phone]. Utilizing [facility] [phone]. Pt noted to be unable to fully participate with DC planning discussion 2ndary to poor cognition. AS per son, Mauro, pt was ambulating independently without any devices @ assisted living facility. Home care services/expectations, insurance provisions and choices of agencies discussed with son and family deferred to assisted living facility for coordination of home care services. As per assisted living facility , [facility], once pt is medically optimized for transition back to [facility], will need the following DC paperwork to be faxed over to [phone] or [phone]: assisted living facility form 4449C ( filled out by MD directing aftercare back @ facility), updated PT note; any NEW/CHANGED meds to be sent over to [facility]; home care agency of choice is [facility] living [facility]'s onsite agency-[facility] [phone]/fax [phone]. DC pending hospital course. CM remains available and continues to follow case.

## 2024-07-25 NOTE — PROGRESS NOTE ADULT - ASSESSMENT
The patient is an 89 year old male with a history of HTN, HL, hypothyroidism who presents with shortness of breath, cough.    Plan:  - ECG with sinus rhythm and atrial ectopy  - CT chest with no PNA  -   - Continue metoprolol succinate 50 mg daily  - Continue simvastatin 20 mg daily  - Hold triamterene-HCTZ for now  - ID and ortho follow-up  - On IV antibiotics

## 2024-07-25 NOTE — PROGRESS NOTE ADULT - SUBJECTIVE AND OBJECTIVE BOX
Pt pain stable. No new effusion or tacking cellulitis    AVSS  Knee ROM and WB tolerated      Knee aspirate shows 32.6k WBC with slight left shift. Positive CPPD crystals. Gram stain negative. No orangism grown so far.

## 2024-07-25 NOTE — PROGRESS NOTE ADULT - ASSESSMENT
Plan:  Low index of suspicion for infection. 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 Recommend empirical treatment for such.     No surgery planned at this time  FU with me in the office in 1 week. My office will reach out  Please call me if there are any acute changes in clinical presentation

## 2024-07-25 NOTE — PROGRESS NOTE ADULT - SUBJECTIVE AND OBJECTIVE BOX
Chief Complaint: Shortness of breath, cough    Interval Events: No events overnight.    Review of Systems:  General: No fevers, chills, weight gain  Skin: No rashes, color changes  Cardiovascular: No chest pain, orthopnea  Respiratory: No shortness of breath, cough  Gastrointestinal: No nausea, abdominal pain  Genitourinary: No incontinence, pain with urination  Musculoskeletal: No pain, swelling, decreased range of motion  Neurological: No headache, weakness  Psychiatric: No depression, anxiety  Endocrine: No weight gain, increased thirst  All other systems are comprehensively negative.    Physical Exam:  Vitals:        Vital Signs Last 24 Hrs  T(C): 36.7 (25 Jul 2024 05:26), Max: 37.3 (24 Jul 2024 13:34)  T(F): 98 (25 Jul 2024 05:26), Max: 99.1 (24 Jul 2024 13:34)  HR: 66 (25 Jul 2024 05:26) (66 - 84)  BP: 136/86 (25 Jul 2024 05:26) (120/60 - 138/64)  BP(mean): --  RR: 19 (25 Jul 2024 05:26) (18 - 19)  SpO2: 95% (25 Jul 2024 05:26) (93% - 95%)  Parameters below as of 25 Jul 2024 05:26  Patient On (Oxygen Delivery Method): room air  General: NAD  HEENT: MMM  Neck: No JVD, no carotid bruit  Lungs: CTAB  CV: RRR, nl S1/S2, no M/R/G  Abdomen: S/NT/ND, +BS  Extremities: No LE edema, no cyanosis  Neuro: AAOx3, non-focal  Skin: No rash    Labs:                        15.0   11.34 )-----------( 252      ( 24 Jul 2024 11:44 )             45.8     07-24    134<L>  |  97  |  16  ----------------------------<  107<H>  3.5   |  30  |  0.97    Ca    9.7      24 Jul 2024 11:44    TPro  7.6  /  Alb  2.9<L>  /  TBili  1.0  /  DBili  x   /  AST  35  /  ALT  11  /  AlkPhos  83  07-23        PT/INR - ( 23 Jul 2024 16:58 )   PT: 12.9 sec;   INR: 1.19 ratio         PTT - ( 23 Jul 2024 16:58 )  PTT:31.6 sec    ECG/Telemetry: Sinus rhythm

## 2024-07-26 LAB
ANION GAP SERPL CALC-SCNC: 5 MMOL/L — SIGNIFICANT CHANGE UP (ref 5–17)
BUN SERPL-MCNC: 16 MG/DL — SIGNIFICANT CHANGE UP (ref 7–23)
CALCIUM SERPL-MCNC: 9.5 MG/DL — SIGNIFICANT CHANGE UP (ref 8.4–10.5)
CHLORIDE SERPL-SCNC: 103 MMOL/L — SIGNIFICANT CHANGE UP (ref 96–108)
CO2 SERPL-SCNC: 30 MMOL/L — SIGNIFICANT CHANGE UP (ref 22–31)
CREAT SERPL-MCNC: 0.85 MG/DL — SIGNIFICANT CHANGE UP (ref 0.5–1.3)
CULTURE RESULTS: NO GROWTH — SIGNIFICANT CHANGE UP
EGFR: 84 ML/MIN/1.73M2 — SIGNIFICANT CHANGE UP
GLUCOSE SERPL-MCNC: 99 MG/DL — SIGNIFICANT CHANGE UP (ref 70–99)
HCT VFR BLD CALC: 41.7 % — SIGNIFICANT CHANGE UP (ref 39–50)
HGB BLD-MCNC: 13.3 G/DL — SIGNIFICANT CHANGE UP (ref 13–17)
MCHC RBC-ENTMCNC: 28.9 PG — SIGNIFICANT CHANGE UP (ref 27–34)
MCHC RBC-ENTMCNC: 31.9 GM/DL — LOW (ref 32–36)
MCV RBC AUTO: 90.5 FL — SIGNIFICANT CHANGE UP (ref 80–100)
NRBC # BLD: 0 /100 WBCS — SIGNIFICANT CHANGE UP (ref 0–0)
PLATELET # BLD AUTO: 185 K/UL — SIGNIFICANT CHANGE UP (ref 150–400)
POTASSIUM SERPL-MCNC: 4.6 MMOL/L — SIGNIFICANT CHANGE UP (ref 3.5–5.3)
POTASSIUM SERPL-SCNC: 4.6 MMOL/L — SIGNIFICANT CHANGE UP (ref 3.5–5.3)
RBC # BLD: 4.61 M/UL — SIGNIFICANT CHANGE UP (ref 4.2–5.8)
RBC # FLD: 14.4 % — SIGNIFICANT CHANGE UP (ref 10.3–14.5)
SODIUM SERPL-SCNC: 138 MMOL/L — SIGNIFICANT CHANGE UP (ref 135–145)
SPECIMEN SOURCE: SIGNIFICANT CHANGE UP
WBC # BLD: 9.99 K/UL — SIGNIFICANT CHANGE UP (ref 3.8–10.5)
WBC # FLD AUTO: 9.99 K/UL — SIGNIFICANT CHANGE UP (ref 3.8–10.5)

## 2024-07-26 PROCEDURE — 99232 SBSQ HOSP IP/OBS MODERATE 35: CPT

## 2024-07-26 RX ADMIN — Medication 50 MILLIGRAM(S): at 05:26

## 2024-07-26 RX ADMIN — COLCHICINE 0.6 MILLIGRAM(S): 0.6 TABLET, FILM COATED ORAL at 17:06

## 2024-07-26 RX ADMIN — Medication 20 MILLIGRAM(S): at 21:51

## 2024-07-26 RX ADMIN — ENOXAPARIN SODIUM 40 MILLIGRAM(S): 120 INJECTION SUBCUTANEOUS at 12:48

## 2024-07-26 RX ADMIN — Medication 25 MICROGRAM(S): at 05:26

## 2024-07-26 RX ADMIN — COLCHICINE 0.6 MILLIGRAM(S): 0.6 TABLET, FILM COATED ORAL at 05:26

## 2024-07-26 NOTE — PROGRESS NOTE ADULT - ASSESSMENT
The patient is an 89 year old male with a history of HTN, HL, hypothyroidism who presents with shortness of breath, cough.    Plan:  - ECG with sinus rhythm and atrial ectopy  - CT chest with no PNA  -   - Continue metoprolol succinate 50 mg daily  - Continue simvastatin 20 mg daily  - Hold triamterene-HCTZ for now  - ID and ortho follow-up  - Arthrocentesis results consistent with pseudogout

## 2024-07-26 NOTE — PHYSICAL THERAPY INITIAL EVALUATION ADULT - PERTINENT HX OF CURRENT PROBLEM, REHAB EVAL
as per chart - This is an 89 y/o M with PMH of HTN, Dyslipidemia, and Hypothyroidism who presented from [facility] with left knee swelling & pain associated with generalized weakness. No reported fever or chills prior to presentation, denies knee trauma or falls, states that he has the above complaints "for a while". At the ED he was found with fever of 103.2 F with evidence of sepsis, seen by Ortho, an arthrocentesis was performed, and patient was started on antibiotics. Currently denies any pain.

## 2024-07-26 NOTE — PHYSICAL THERAPY INITIAL EVALUATION ADULT - IMPAIRMENTS CONTRIBUTING TO GAIT DEVIATIONS, PT EVAL
impaired balance/pain/decreased ROM/decreased strength Ms. Chaz Taylor is a 67 year old female with a past medical history of Parkinson disease, COPD (not on home oxygen), schizoaffective disorder (currently not on medications),  Ms. Chaz Taylor is a 67 year old female with a past medical history of Parkinson disease (on amantadine), COPD (not on home oxygen), schizoaffective disorder (currently not on medications), DM2 (on metformin)  presented to the ED for unwitnessed fall. She was found lying on the ground with face downwards since her home aide left her home at 7pm yesterday until 10am this morning. Per home aide, she presumably fell to the martha Ms. Chaz Taylor is a 67 year old female with a past medical history of Parkinson disease (on amantadine), COPD (not on home oxygen), schizoaffective disorder (currently not on medications), DM2 (on metformin)  presented to the ED for unwitnessed fall. She was found lying on the ground with face downwards at 10am this morning since her home aide left at 7pm yesterday. Per home aide, she presumably fell to the ground for attempting to answer the door for medications. Pt has a baseline of chronic right-side weakness, bilateral hand termor and needed walker to ambulate. Per home aide, pt has been doing well with no physical complaints. She had two prior inconsequential falls due to similar events of attempting to answering door. Pt is limited in terms of history telling but alert and oriented x2, follow command, answering some questions.     At the ED, Pt is found to be severely hypoxic with O2 sat less than 70, tachycardiac, tachypneic, febrile to 100.9. Her lab is positive for leukocytosis, increased AG, significant elevation of CK and BNP, metabolic acidosis from VBG, COVID+.  CT chest is positive for atelectasis and right lung lobular consolidation. She responded to non-rebreather with O2 sat 100% then tolerated well to nasal canula 6 litter with goal of O2 sat > 88%. Her vitals are improved after 1L NS with /64, HR 93, RR 24.  Pt is admitted for acute respiratory failure in the setting of covid vs bacteria pneumonia and sepsis as well as rhabdomyolysis.

## 2024-07-26 NOTE — CASE MANAGEMENT PROGRESS NOTE - NSCMPROGRESSNOTE_GEN_ALL_CORE
RN/CM notified during Interdisciplinary rounds that pt remains acute, had urinary retention issues that required FR#16 garcia insertion on 07/24/2024, pt is without garcia @ baseline. MD with plan to do trial voiding. Pt on IV Rocephin. PT eval pending. Pt is a resident of [facility] [phone]. Utilizing [facility] [phone]. Pt noted to be unable to fully participate with DC planning discussion as pt has very poor memory and recall. AS per son, Mauro, pt was ambulating independently without any devices @ assisted living facility. Home care services/expectations, insurance provisions and choices of agencies discussed with son and family deferred to assisted living facility for coordination of home care services. As per [facility] living facility , [facility], once pt is medically optimized for transition back to [facility], will need the following DC paperwork to be faxed over to [phone] or [phone]: assisted living facility form 4449C ( filled out by MD directing aftercare back @ facility), updated PT note; any NEW/CHANGED meds to be sent over to [facility]; home care agency of choice is [facility]'s onsite agency-[facility] [phone]/fax [phone]. DC pending hospital course. CM remains available and continues to follow case.   RN/CM notified during Interdisciplinary rounds that pt remains acute, had urinary retention issues that required FR#16 garcia insertion on 07/24/2024, pt is without garcia @ baseline. MD with plan to do trial voiding. PT eval pending. Pt is a resident of [facility] living [facility] [phone]. Utilizing [facility] [phone]. Pt noted to be unable to fully participate with DC planning discussion as pt has very poor memory and recall. AS per son, Mauro, pt was ambulating independently without any devices @ assisted living facility. Home care services/expectations, insurance provisions and choices of agencies discussed with son and family deferred to assisted living facility for coordination of home care services. As per assisted living facility , [facility], once pt is medically optimized for transition back to [facility], will need the following DC paperwork to be faxed over to [phone] or [phone]: assisted living facility form 4449C ( filled out by MD directing aftercare back @ facility), updated PT note; any NEW/CHANGED meds to be sent over to [facility]; home care agency of choice is [facility] living [facility]'s onsite agency-[facility] [phone]/fax [phone]. DC pending hospital course. CM remains available and continues to follow case.

## 2024-07-26 NOTE — PHYSICAL THERAPY INITIAL EVALUATION ADULT - RANGE OF MOTION EXAMINATION, REHAB EVAL
L LE impairment secondary to pain/bilateral upper extremity ROM was WFL (within functional limits)/Right LE ROM was WFL (within functional limits)/deficits as listed below

## 2024-07-26 NOTE — PROGRESS NOTE ADULT - SUBJECTIVE AND OBJECTIVE BOX
Chief Complaint: Shortness of breath, cough    Interval Events: No events overnight.    Review of Systems:  General: No fevers, chills, weight gain  Skin: No rashes, color changes  Cardiovascular: No chest pain, orthopnea  Respiratory: No shortness of breath, cough  Gastrointestinal: No nausea, abdominal pain  Genitourinary: No incontinence, pain with urination  Musculoskeletal: No pain, swelling, decreased range of motion  Neurological: No headache, weakness  Psychiatric: No depression, anxiety  Endocrine: No weight gain, increased thirst  All other systems are comprehensively negative.    Physical Exam:  Vital Signs Last 24 Hrs  T(C): 36.8 (26 Jul 2024 05:04), Max: 38.1 (25 Jul 2024 18:02)  T(F): 98.2 (26 Jul 2024 05:04), Max: 100.5 (25 Jul 2024 18:02)  HR: 78 (26 Jul 2024 05:04) (70 - 80)  BP: 156/78 (26 Jul 2024 05:04) (117/70 - 156/78)  BP(mean): --  RR: 18 (26 Jul 2024 05:04) (18 - 18)  SpO2: 92% (26 Jul 2024 05:04) (92% - 95%)  Parameters below as of 26 Jul 2024 05:04  Patient On (Oxygen Delivery Method): room air  General: NAD  HEENT: MMM  Neck: No JVD, no carotid bruit  Lungs: CTAB  CV: RRR, nl S1/S2, no M/R/G  Abdomen: S/NT/ND, +BS  Extremities: No LE edema, no cyanosis  Neuro: AAOx3, non-focal  Skin: No rash    Labs:    07-26    138  |  103  |  16  ----------------------------<  99  4.6   |  30  |  0.85    Ca    9.5      26 Jul 2024 06:00    TPro  6.8  /  Alb  2.3<L>  /  TBili  0.8  /  DBili  x   /  AST  26  /  ALT  <10<L>  /  AlkPhos  73  07-25                        13.3   9.99  )-----------( 185      ( 26 Jul 2024 06:00 )             41.7       ECG/Telemetry: Sinus rhythm

## 2024-07-26 NOTE — PROGRESS NOTE ADULT - SUBJECTIVE AND OBJECTIVE BOX
Subjective: Patient seen and examined. No overnight events.  Continues to be confused. No pain and good pain controlled.     MEDICATIONS  (STANDING):  colchicine 0.6 milliGRAM(s) Oral two times a day  enoxaparin Injectable 40 milliGRAM(s) SubCutaneous every 24 hours  levothyroxine 25 MICROGram(s) Oral daily  metoprolol succinate ER 50 milliGRAM(s) Oral daily  simvastatin 20 milliGRAM(s) Oral at bedtime    MEDICATIONS  (PRN):  acetaminophen     Tablet .. 650 milliGRAM(s) Oral every 6 hours PRN Temp greater or equal to 38C (100.4F), Mild Pain (1 - 3)  guaiFENesin Oral Liquid (Sugar-Free) 200 milliGRAM(s) Oral every 6 hours PRN Cough      Allergies    No Known Allergies    Intolerances        Vital Signs Last 24 Hrs  T(C): 36.8 (26 Jul 2024 05:04), Max: 38.1 (25 Jul 2024 18:02)  T(F): 98.2 (26 Jul 2024 05:04), Max: 100.5 (25 Jul 2024 18:02)  HR: 78 (26 Jul 2024 05:04) (70 - 80)  BP: 156/78 (26 Jul 2024 05:04) (117/70 - 156/78)  BP(mean): --  RR: 18 (26 Jul 2024 05:04) (18 - 18)  SpO2: 92% (26 Jul 2024 05:04) (92% - 95%)    Parameters below as of 26 Jul 2024 05:04  Patient On (Oxygen Delivery Method): room air        PHYSICAL EXAM:  GENERAL: NAD, well-groomed, well-developed  HEAD:  Atraumatic, Normocephalic  ENMT: Moist mucous membranes,   NECK: Supple, No JVD, Normal thyroid  NERVOUS SYSTEM:  All 4 extremities mobile, no gross sensory deficits.   CHEST/LUNG: Clear to auscultation bilaterally; No rales, rhonchi, wheezing, or rubs  HEART: Regular rate and rhythm; No murmurs, rubs, or gallops  ABDOMEN: Soft, Nontender, Nondistended; Bowel sounds present  EXTREMITIES:  2+ Peripheral Pulses, No clubbing, cyanosis, or edema      LABS:                        13.3   9.99  )-----------( 185      ( 26 Jul 2024 06:00 )             41.7     26 Jul 2024 06:00    138    |  103    |  16     ----------------------------<  99     4.6     |  30     |  0.85     Ca    9.5        26 Jul 2024 06:00    TPro  6.8    /  Alb  2.3    /  TBili  0.8    /  DBili  x      /  AST  26     /  ALT  <10    /  AlkPhos  73     25 Jul 2024 20:14      Urinalysis Basic - ( 26 Jul 2024 06:00 )    Color: x / Appearance: x / SG: x / pH: x  Gluc: 99 mg/dL / Ketone: x  / Bili: x / Urobili: x   Blood: x / Protein: x / Nitrite: x   Leuk Esterase: x / RBC: x / WBC x   Sq Epi: x / Non Sq Epi: x / Bacteria: x      CAPILLARY BLOOD GLUCOSE          RADIOLOGY & ADDITIONAL TESTS:    Imaging Personally Reviewed:  [ ] YES     Consultant(s) Notes Reviewed:      Care Discussed with Consultants/Other Providers:    Advanced Directives: [ ] DNR  [ ] No feeding tube  [ ] MOLST in chart  [ ] MOLST completed today  [ ] Unknown

## 2024-07-26 NOTE — PROGRESS NOTE ADULT - SUBJECTIVE AND OBJECTIVE BOX
[facility] examined and chart reviewed.    INTERVAL HPI/ OVERNIGHT EVENTS:   Afebrile. No events.    PAST MEDICAL & SURGICAL HISTORY:  No pertinent past medical history      No significant past surgical history        For details regarding the patient's social history, family history, and other miscellaneous elements, please refer the initial infectious diseases consultation and/or the admitting history and physical examination for this admission.    ROS:  CONSTITUTIONAL:  Negative fever or chills  EYES:  Negative  blurry vision or double vision  CARDIOVASCULAR:  Negative for chest pain or palpitations  RESPIRATORY:  Negative for cough, wheezing, or SOB   GASTROINTESTINAL:  Negative for nausea, vomiting, diarrhea, constipation, or abdominal pain  GENITOURINARY:  Negative frequency, urgency or dysuria  NEUROLOGIC:  No headache, confusion, dizziness, lightheadedness  All other systems were reviewed and are negative     No Known Allergies      Current inpatient medications :    ANTIBIOTICS/RELEVANT:    MEDICATIONS  (STANDING):  colchicine 0.6 milliGRAM(s) Oral two times a day  enoxaparin Injectable 40 milliGRAM(s) SubCutaneous every 24 hours  levothyroxine 25 MICROGram(s) Oral daily  metoprolol succinate ER 50 milliGRAM(s) Oral daily  simvastatin 20 milliGRAM(s) Oral at bedtime    MEDICATIONS  (PRN):  acetaminophen     Tablet .. 650 milliGRAM(s) Oral every 6 hours PRN Temp greater or equal to 38C (100.4F), Mild Pain (1 - 3)  guaiFENesin Oral Liquid (Sugar-Free) 200 milliGRAM(s) Oral every 6 hours PRN Cough        Objective:  Vital Signs Last 24 Hrs  T(C): 36.8 (26 Jul 2024 05:04), Max: 38.1 (25 Jul 2024 18:02)  T(F): 98.2 (26 Jul 2024 05:04), Max: 100.5 (25 Jul 2024 18:02)  HR: 78 (26 Jul 2024 05:04) (70 - 80)  BP: 156/78 (26 Jul 2024 05:04) (117/70 - 156/78)  RR: 18 (26 Jul 2024 05:04) (18 - 18)  SpO2: 92% (26 Jul 2024 05:04) (92% - 95%)    Parameters below as of 26 Jul 2024 05:04  Patient On (Oxygen Delivery Method): room air    Physical Exam:  General:  no acute distress  Neck: supple, trachea midline  Lungs: clear, no wheeze/rhonchi  Cardiovascular: regular rate and rhythm, S1 S2  Abdomen: soft, nontender,  bowel sounds normal  Neurological: alert and oriented x3  Skin: no rash  Extremities: Left knee swelling no erythema no pain      LABS:                        13.3   9.99  )-----------( 185      ( 26 Jul 2024 06:00 )             41.7   07-26    138  |  103  |  16  ----------------------------<  99  4.6   |  30  |  0.85    Ca    9.5      26 Jul 2024 06:00    TPro  6.8  /  Alb  2.3<L>  /  TBili  0.8  /  DBili  x   /  AST  26  /  ALT  <10<L>  /  AlkPhos  73  07-25    Cell Count, Body Fluid (07.23.24 @ 20:50)    Tube Type: Lavender   Fluid Type: Other   Body Fluid Appearance: Turbid   Color - Body Fluid: Yellow   Total Nucleated Cell Count, Body Fluid: 57983: Reference Ranges:  Synovial Fluid  Total nucleated cells < 150 cells/uL  Neutrophils <25%  Lymphocytes 10-15%  Monocytes/Macrophages </=70%  Other parameters- No established reference ranges.  Bronchoalveolar lavage  Macrophages >85%  Lymphocytes 10-15%  Neutrophils <3%  Eosinophils <1%  Other parameters- No established reference ranges.  Peritoneal/pleural/pericardial fluid/other body fluid types  No established reference ranges. /uL   Total RBC Count: 87048 /uL   Neutrophils-Body Fluid: 60 %   BF Lymphocytes: 1 %   Monocyte/Macrophage Count - Body Fluid: 39 %    Culture - Joint (collected 23 Jul 2024 20:50)  Crystals, Synovial Fluid (07.23.24 @ 20:50)    Synovial Crystals Clarity: Turbid   Synovial Crystals Tube: Red Top Tube   Synovial Crystals Color: Yellow   Synovial Crystals Id: Crystals Present Intracellular CPPD (calcium pyrophosphate) present.      MICROBIOLOGY:    Source: Knee  Gram Stain (24 Jul 2024 20:52):    Moderate polymorphonuclear leukocytes per low power field    No organisms seen    Culture - Blood (collected 23 Jul 2024 16:58)  Source: .Blood Blood-Peripheral  Preliminary Report (25 Jul 2024 01:03):    No growth at 24 hours    Culture - Blood (collected 23 Jul 2024 16:58)  Source: .Blood Blood-Peripheral  Preliminary Report (25 Jul 2024 01:03):    No growth at 24 hours      RADIOLOGY & ADDITIONAL STUDIES:  ACC: [id number] EXAM:  CT KNEE ONLY LT   ORDERED BY: SAIRA EM     PROCEDURE DATE:  07/23/2024          INTERPRETATION:  INDICATION:  pain, swelling    TECHNIQUE: CT of the left knee without contrast with axial, sagittal, and   coronal multiplanar reformats.    Comparison:Knee radiographs dated 7/23/2024    FINDINGS:    No acute displaced fracture or dislocation. Tricompartmental knee   osteoarthritis with areas of chondrocalcinosis and joint bodies within   the suprapatellar recess of the knee joint space. Enthesopathic changes   at the quadriceps tendon and insertion and patellar tendon origin.   Nonspecific large knee joint effusion. Trace Stearns's cyst.    Atherosclerotic calcifications are noted. Mild subcutaneous edema about   the anterior knee.    IMPRESSION:    No acute displaced fracture or dislocation.    Nonspecific large knee joint effusion. If there is clinical concern for   septic arthritis of the knee, knee joint aspiration should be performed.    Tricompartmental knee osteoarthritis with areas of chondrocalcinosis and   joint bodies.      ACC: [id number] EXAM:  CT CHEST   ORDERED BY: SAIRA EM     PROCEDURE DATE:  07/23/2024          INTERPRETATION:  Clinical information: Cough.    CT scan of the chest was obtained without administration of intravenous   contrast.    No hilaror mediastinal adenopathy is noted.    Heart is normal in size. Calcification of the coronary arteries noted. No   pericardial effusion is noted.    No endobronchial lesions are noted. Lungs are clear. No pleural effusions   are noted.    Below the diaphragm, visualized portions of the abdomen are unremarkable.    Degenerative changes of the spine are noted.    IMPRESSION: There is no pneumonia.    Assessment :   88YO M PMH of HTN, Dyslipidemia, and Hypothyroidism resident of [facility] who presented to the hospital with c/o left knee swelling & pain associated with generalized weakness. Denies difficulty walking.  In ED he was found with fever of 103.2 F. Seen by Ortho, an arthrocentesis was performed.  Knee aspirate +Intracellular CPPD (calcium pyrophosphate) present- Likely pseudogout   Fluid culture NGTD  Septic joint ruled out  Rocha placed 7/25 sec AUR.    Plan :   Supportive care  Rocha per primary team  Trend temps and cbc  Pulm toileting  Asp precautions    Continue with present regiment.  Appropriate use of antibiotics and adverse effects reviewed.      > 35 minutes were spent in direct patient care reviewing notes, medications ,labs data/ imaging , discussion with multidisciplinary team.    Thank you for allowing me to participate in care of your patient .    Holly Contreras MD  Infectious Disease  [phone]

## 2024-07-26 NOTE — PROGRESS NOTE ADULT - ASSESSMENT
89-year-old male brought in by ambulance from [facility] due to left knee pain and swelling.  Patient was found to have a fever in the ER.  Patient notes he has chronically pain to the left knee.  Patient reports experiencing a cough.    eval crystal arthropathy  eval septic joint  cough  OP  OA      fever noted  on colchicine  vs noted      ortho eval noted  pain assessment  cough rx regimen  cx - biomarkers  ct chest clear  knee imaging reviewed  monitor VS and Sat  GOC discussion

## 2024-07-26 NOTE — PHYSICAL THERAPY INITIAL EVALUATION ADULT - ADDITIONAL COMMENTS
Pt is a poor historian, unable to report PLOF; as per chart per CM note, leslie Wade states pt was independent without any AD prior to admission.

## 2024-07-26 NOTE — PROGRESS NOTE ADULT - SUBJECTIVE AND OBJECTIVE BOX
Date/Time Patient Seen:  		  Referring MD:   Data Reviewed	       Patient is a 89y old  Male who presents with a chief complaint of Left knee swelling & pain. (25 Jul 2024 15:25)      Subjective/HPI     PAST MEDICAL & SURGICAL HISTORY:  No pertinent past medical history    No significant past surgical history          Medication list         MEDICATIONS  (STANDING):  colchicine 0.6 milliGRAM(s) Oral two times a day  enoxaparin Injectable 40 milliGRAM(s) SubCutaneous every 24 hours  levothyroxine 25 MICROGram(s) Oral daily  metoprolol succinate ER 50 milliGRAM(s) Oral daily  simvastatin 20 milliGRAM(s) Oral at bedtime    MEDICATIONS  (PRN):  acetaminophen     Tablet .. 650 milliGRAM(s) Oral every 6 hours PRN Temp greater or equal to 38C (100.4F), Mild Pain (1 - 3)  guaiFENesin Oral Liquid (Sugar-Free) 200 milliGRAM(s) Oral every 6 hours PRN Cough         Vitals log        ICU Vital Signs Last 24 Hrs  T(C): 36.8 (26 Jul 2024 05:04), Max: 38.1 (25 Jul 2024 18:02)  T(F): 98.2 (26 Jul 2024 05:04), Max: 100.5 (25 Jul 2024 18:02)  HR: 78 (26 Jul 2024 05:04) (70 - 80)  BP: 156/78 (26 Jul 2024 05:04) (117/70 - 156/78)  BP(mean): --  ABP: --  ABP(mean): --  RR: 18 (26 Jul 2024 05:04) (18 - 18)  SpO2: 92% (26 Jul 2024 05:04) (92% - 95%)    O2 Parameters below as of 26 Jul 2024 05:04  Patient On (Oxygen Delivery Method): room air                 Input and Output:  I&O's Detail    24 Jul 2024 07:01  -  25 Jul 2024 07:00  --------------------------------------------------------  IN:  Total IN: 0 mL    OUT:    Indwelling Catheter - Urethral (mL): 500 mL    Voided (mL): 150 mL  Total OUT: 650 mL    Total NET: -650 mL      25 Jul 2024 07:01  -  26 Jul 2024 06:02  --------------------------------------------------------  IN:    Lactated Ringers Bolus: 1000 mL  Total IN: 1000 mL    OUT:  Total OUT: 0 mL    Total NET: 1000 mL          Lab Data                        12.9   11.86 )-----------( 262      ( 25 Jul 2024 20:14 )             39.1     07-25    131<L>  |  99  |  20  ----------------------------<  115<H>  4.1   |  29  |  0.92    Ca    9.2      25 Jul 2024 20:14    TPro  6.8  /  Alb  2.3<L>  /  TBili  0.8  /  DBili  x   /  AST  26  /  ALT  <10<L>  /  AlkPhos  73  07-25            Review of Systems	      Objective     Physical Examination    heart s1s2  lung dc BS  head nc      Pertinent Lab findings & Imaging      Aj:  NO   Adequate UO     I&O's Detail    24 Jul 2024 07:01  -  25 Jul 2024 07:00  --------------------------------------------------------  IN:  Total IN: 0 mL    OUT:    Indwelling Catheter - Urethral (mL): 500 mL    Voided (mL): 150 mL  Total OUT: 650 mL    Total NET: -650 mL      25 Jul 2024 07:01  -  26 Jul 2024 06:02  --------------------------------------------------------  IN:    Lactated Ringers Bolus: 1000 mL  Total IN: 1000 mL    OUT:  Total OUT: 0 mL    Total NET: 1000 mL               Discussed with:     Cultures:	        Radiology

## 2024-07-26 NOTE — PROGRESS NOTE ADULT - ASSESSMENT
89M HTN, HLD, Hypothyroidism admitted for Knee Swelling    Pseudogout   Confirmed S/P Arthrocentesis  Colchicine BID  Monitor BM  Antibiotics discontinued    HTN / HLD  Controlled  Metoprolol + HLD    Hypothyroidism  Synthroid    Diet  Regular    DVT Prophylaxis  Lovenox    Disposition   Discharge planning pending hospital course  89M HTN, HLD, Hypothyroidism admitted for Knee Swelling    Pseudogout   Confirmed S/P Arthrocentesis  Colchicine BID  Monitor BM  Antibiotics discontinued    Acute Urinary Retention  Has Rocha Catheter in place   Will do TOV now and determine if longer term Rocha is needed    HTN / HLD  Controlled  Metoprolol + HLD    Hypothyroidism  Synthroid    Diet  Regular    DVT Prophylaxis  Lovenox    Disposition   Discharge planning pending hospital course

## 2024-07-27 PROCEDURE — 99233 SBSQ HOSP IP/OBS HIGH 50: CPT

## 2024-07-27 RX ADMIN — Medication 20 MILLIGRAM(S): at 21:11

## 2024-07-27 RX ADMIN — COLCHICINE 0.6 MILLIGRAM(S): 0.6 TABLET, FILM COATED ORAL at 17:07

## 2024-07-27 RX ADMIN — COLCHICINE 0.6 MILLIGRAM(S): 0.6 TABLET, FILM COATED ORAL at 05:52

## 2024-07-27 RX ADMIN — Medication 25 MICROGRAM(S): at 05:53

## 2024-07-27 RX ADMIN — Medication 50 MILLIGRAM(S): at 05:53

## 2024-07-27 RX ADMIN — ENOXAPARIN SODIUM 40 MILLIGRAM(S): 120 INJECTION SUBCUTANEOUS at 11:07

## 2024-07-27 NOTE — PROGRESS NOTE ADULT - SUBJECTIVE AND OBJECTIVE BOX
YUKOGABRIELA AL[facility]O [facility] , patient examined and chart reviewed.    INTERVAL HPI/ OVERNIGHT EVENTS:   Afebrile. No events.  Aj vargas Pt voiding.  In chair.    PAST MEDICAL & SURGICAL HISTORY:  No pertinent past medical history      No significant past surgical history        For details regarding the patient's social history, family history, and other miscellaneous elements, please refer the initial infectious diseases consultation and/or the admitting history and physical examination for this admission.    ROS:  CONSTITUTIONAL:  Negative fever or chills  EYES:  Negative  blurry vision or double vision  CARDIOVASCULAR:  Negative for chest pain or palpitations  RESPIRATORY:  Negative for cough, wheezing, or SOB   GASTROINTESTINAL:  Negative for nausea, vomiting, diarrhea, constipation, or abdominal pain  GENITOURINARY:  Negative frequency, urgency or dysuria  NEUROLOGIC:  No headache, confusion, dizziness, lightheadedness  All other systems were reviewed and are negative     No Known Allergies      Current inpatient medications :    ANTIBIOTICS/RELEVANT:    MEDICATIONS  (STANDING):  colchicine 0.6 milliGRAM(s) Oral two times a day  enoxaparin Injectable 40 milliGRAM(s) SubCutaneous every 24 hours  levothyroxine 25 MICROGram(s) Oral daily  metoprolol succinate ER 50 milliGRAM(s) Oral daily  simvastatin 20 milliGRAM(s) Oral at bedtime    MEDICATIONS  (PRN):  acetaminophen     Tablet .. 650 milliGRAM(s) Oral every 6 hours PRN Temp greater or equal to 38C (100.4F), Mild Pain (1 - 3)  guaiFENesin Oral Liquid (Sugar-Free) 200 milliGRAM(s) Oral every 6 hours PRN Cough      Objective:  Vital Signs Last 24 Hrs  T(C): 36.6 (27 Jul 2024 20:40), Max: 37.2 (27 Jul 2024 13:27)  T(F): 97.8 (27 Jul 2024 20:40), Max: 98.9 (27 Jul 2024 13:27)  HR: 73 (27 Jul 2024 20:40) (67 - 74)  BP: 150/81 (27 Jul 2024 20:40) (125/74 - 150/81)  RR: 18 (27 Jul 2024 20:40) (18 - 18)  SpO2: 94% (27 Jul 2024 20:40) (92% - 94%)    Parameters below as of 27 Jul 2024 20:40  Patient On (Oxygen Delivery Method): room air      Physical Exam:  General:  no acute distress  Neck: supple, trachea midline  Lungs: clear, no wheeze/rhonchi  Cardiovascular: regular rate and rhythm, S1 S2  Abdomen: soft, nontender,  bowel sounds normal  Neurological: alert and oriented x3  Skin: no rash  Extremities: Left knee swelling- decreased  no erythema no pain    LABS:                        13.3   9.99  )-----------( 185      ( 26 Jul 2024 06:00 )             41.7   07-26    138  |  103  |  16  ----------------------------<  99  4.6   |  30  |  0.85    Ca    9.5      26 Jul 2024 06:00    Cell Count, Body Fluid (07.23.24 @ 20:50)    Tube Type: Lavender   Fluid Type: Other   Body Fluid Appearance: Turbid   Color - Body Fluid: Yellow   Total Nucleated Cell Count, Body Fluid: 77501: Reference Ranges:  Synovial Fluid  Total nucleated cells < 150 cells/uL  Neutrophils <25%  Lymphocytes 10-15%  Monocytes/Macrophages </=70%  Other parameters- No established reference ranges.  Bronchoalveolar lavage  Macrophages >85%  Lymphocytes 10-15%  Neutrophils <3%  Eosinophils <1%  Other parameters- No established reference ranges.  Peritoneal/pleural/pericardial fluid/other body fluid types  No established reference ranges. /uL   Total RBC Count: 00041 /uL   Neutrophils-Body Fluid: 60 %   BF Lymphocytes: 1 %   Monocyte/Macrophage Count - Body Fluid: 39 %    Culture - Joint (collected 23 Jul 2024 20:50)  Crystals, Synovial Fluid (07.23.24 @ 20:50)    Synovial Crystals Clarity: Turbid   Synovial Crystals Tube: Red Top Tube   Synovial Crystals Color: Yellow   Synovial Crystals Id: Crystals Present Intracellular CPPD (calcium pyrophosphate) present.      MICROBIOLOGY:    Source: Knee  Gram Stain (24 Jul 2024 20:52):    Moderate polymorphonuclear leukocytes per low power field    No organisms seen    Culture - Blood (collected 23 Jul 2024 16:58)  Source: .Blood Blood-Peripheral  Preliminary Report (25 Jul 2024 01:03):    No growth at 24 hours    Culture - Blood (collected 23 Jul 2024 16:58)  Source: .Blood Blood-Peripheral  Preliminary Report (25 Jul 2024 01:03):    No growth at 24 hours      RADIOLOGY & ADDITIONAL STUDIES:  ACC: [id number] EXAM:  CT KNEE ONLY LT   ORDERED BY: SAIRA EM     PROCEDURE DATE:  07/23/2024          INTERPRETATION:  INDICATION:  pain, swelling    TECHNIQUE: CT of the left knee without contrast with axial, sagittal, and   coronal multiplanar reformats.    Comparison:Knee radiographs dated 7/23/2024    FINDINGS:    No acute displaced fracture or dislocation. Tricompartmental knee   osteoarthritis with areas of chondrocalcinosis and joint bodies within   the suprapatellar recess of the knee joint space. Enthesopathic changes   at the quadriceps tendon and insertion and patellar tendon origin.   Nonspecific large knee joint effusion. Trace Stearns's cyst.    Atherosclerotic calcifications are noted. Mild subcutaneous edema about   the anterior knee.    IMPRESSION:    No acute displaced fracture or dislocation.    Nonspecific large knee joint effusion. If there is clinical concern for   septic arthritis of the knee, knee joint aspiration should be performed.    Tricompartmental knee osteoarthritis with areas of chondrocalcinosis and   joint bodies.      ACC: [id number] EXAM:  CT CHEST   ORDERED BY: SAIRA EM     PROCEDURE DATE:  07/23/2024          INTERPRETATION:  Clinical information: Cough.    CT scan of the chest was obtained without administration of intravenous   contrast.    No hilaror mediastinal adenopathy is noted.    Heart is normal in size. Calcification of the coronary arteries noted. No   pericardial effusion is noted.    No endobronchial lesions are noted. Lungs are clear. No pleural effusions   are noted.    Below the diaphragm, visualized portions of the abdomen are unremarkable.    Degenerative changes of the spine are noted.    IMPRESSION: There is no pneumonia.    Assessment :   88YO M PMH of HTN, Dyslipidemia, and Hypothyroidism resident of [facility] who presented to the hospital with c/o left knee swelling & pain associated with generalized weakness. Denies difficulty walking.  In ED he was found with fever of 103.2 F. Seen by Ortho, an arthrocentesis was performed.  Knee aspirate +Intracellular CPPD (calcium pyrophosphate) present- Likely pseudogout   Fluid culture NGTD  Septic joint ruled out  Rocha placed 7/25 sec AUR.  Rocha dc'd today Pt voiding  Clinically stable    Plan :   Supportive care  Trend temps and cbc  Pulm toileting  Monitor for retention  Asp precautions  Stable from ID standpoint    Continue with present regiment.  Appropriate use of antibiotics and adverse effects reviewed.      > 35 minutes were spent in direct patient care reviewing notes, medications ,labs data/ imaging , discussion with multidisciplinary team.    Thank you for allowing me to participate in care of your patient .    Holly Contreras MD  Infectious Disease  [phone]

## 2024-07-27 NOTE — DIETITIAN INITIAL EVALUATION ADULT - PERTINENT MEDS FT
MEDICATIONS  (STANDING):  colchicine 0.6 milliGRAM(s) Oral two times a day  enoxaparin Injectable 40 milliGRAM(s) SubCutaneous every 24 hours  levothyroxine 25 MICROGram(s) Oral daily  metoprolol succinate ER 50 milliGRAM(s) Oral daily  simvastatin 20 milliGRAM(s) Oral at bedtime    MEDICATIONS  (PRN):  acetaminophen     Tablet .. 650 milliGRAM(s) Oral every 6 hours PRN Temp greater or equal to 38C (100.4F), Mild Pain (1 - 3)  guaiFENesin Oral Liquid (Sugar-Free) 200 milliGRAM(s) Oral every 6 hours PRN Cough

## 2024-07-27 NOTE — PROGRESS NOTE ADULT - ASSESSMENT
89-year-old male brought in by ambulance from [place] due to left knee pain and swelling.  Patient was found to have a fever in the ER.  Patient notes he has chronically pain to the left knee.  Patient reports experiencing a cough.    eval crystal arthropathy  eval septic joint  cough  OP  OA    on colchicine  vs noted  pseudogout    ortho eval noted  pain assessment  cough rx regimen  cx - biomarkers  ct chest clear  knee imaging reviewed  monitor VS and Sat  GOC discussion

## 2024-07-27 NOTE — DIETITIAN INITIAL EVALUATION ADULT - PERTINENT LABORATORY DATA
07-26    138  |  103  |  16  ----------------------------<  99  4.6   |  30  |  0.85    Ca    9.5      26 Jul 2024 06:00    TPro  6.8  /  Alb  2.3<L>  /  TBili  0.8  /  DBili  x   /  AST  26  /  ALT  <10<L>  /  AlkPhos  73  07-25

## 2024-07-27 NOTE — PROGRESS NOTE ADULT - ASSESSMENT
89M HTN, HLD, Hypothyroidism admitted for Knee Swelling    Pseudogout   Confirmed S/P Arthrocentesis  Colchicine BID  Monitor BM  Antibiotics discontinued    Acute Urinary Retention  Resolved and Rocha removed    HTN / HLD  Controlled  Metoprolol + HLD    Hypothyroidism  Synthroid    Diet  Regular    DVT Prophylaxis  Lovenox    Disposition   Patient medically optimized for discharge home if cleared by PT and Ortho.

## 2024-07-27 NOTE — PROGRESS NOTE ADULT - SUBJECTIVE AND OBJECTIVE BOX
Chief Complaint: Shortness of breath, cough    Interval Events: No events overnight.    Review of Systems:  General: No fevers, chills, weight gain  Skin: No rashes, color changes  Cardiovascular: No chest pain, orthopnea  Respiratory: No shortness of breath, cough  Gastrointestinal: No nausea, abdominal pain  Genitourinary: No incontinence, pain with urination  Musculoskeletal: No pain, swelling, decreased range of motion  Neurological: No headache, weakness  Psychiatric: No depression, anxiety  Endocrine: No weight gain, increased thirst  All other systems are comprehensively negative.    Physical Exam:  Vital Signs Last 24 Hrs  T(C): 36.6 (27 Jul 2024 05:08), Max: 37.2 (26 Jul 2024 20:43)  T(F): 97.8 (27 Jul 2024 05:08), Max: 98.9 (26 Jul 2024 20:43)  HR: 74 (27 Jul 2024 05:08) (70 - 74)  BP: 125/74 (27 Jul 2024 05:08) (118/67 - 132/72)  BP(mean): --  RR: 18 (27 Jul 2024 05:08) (18 - 18)  SpO2: 94% (27 Jul 2024 05:08) (93% - 94%)  Parameters below as of 27 Jul 2024 05:08  Patient On (Oxygen Delivery Method): room air  General: NAD  HEENT: MMM  Neck: No JVD, no carotid bruit  Lungs: CTAB  CV: RRR, nl S1/S2, no M/R/G  Abdomen: S/NT/ND, +BS  Extremities: No LE edema, no cyanosis  Neuro: AAOx3, non-focal  Skin: No rash    Labs:    07-26    138  |  103  |  16  ----------------------------<  99  4.6   |  30  |  0.85    Ca    9.5      26 Jul 2024 06:00    TPro  6.8  /  Alb  2.3<L>  /  TBili  0.8  /  DBili  x   /  AST  26  /  ALT  <10<L>  /  AlkPhos  73  07-25                        13.3   9.99  )-----------( 185      ( 26 Jul 2024 06:00 )             41.7       ECG/Telemetry: Sinus rhythm

## 2024-07-27 NOTE — DIETITIAN INITIAL EVALUATION ADULT - ADD RECOMMEND
1) Continue current diet  2) Continue to monitor PO intake, tolerance to diet prescription, weights, labs, GI tolerance, and skin integrity. MD notified via teams regarding any change/changes to diet order.  1) Continue current diet - no ONS needed, intake excellent  2) Continue to monitor PO intake, tolerance to diet prescription, weights, labs, GI tolerance, and skin integrity. MD notified via teams regarding any change/changes to diet order.

## 2024-07-27 NOTE — DIETITIAN INITIAL EVALUATION ADULT - OTHER INFO
Visited patient in room, pt able/unable to answer questions. presents with  appetite/po intake, consuming >% of meals. Dx with sepsis, resolving. No GI complaints. No reported difficulty chewing or swallowing. NKFA. Current adm weight 93kg, weight appears to be stable, will continue to monitor weight trends as able.    Pertinent labs/meds reviewed. Education not appropriate at this time. Continue DASH diet. RD to remain available per protocol.  Visited patient in room, pt able to answer basic questions. presents with excellent  appetite/po intake, consuming >75% of meals. Dx with sepsis, resolving. No GI complaints. No reported difficulty chewing or swallowing. NKFA. Current adm weight 93kg, weight appears to be stable, will continue to monitor weight trends as able.    Pertinent labs/meds reviewed. Education not appropriate at this time. Continue DASH diet, explained reasoning for DASH in house and reviewed DASH seasoning packet to be used as an alternative to salt. RD to remain available per protocol.

## 2024-07-27 NOTE — PROGRESS NOTE ADULT - ASSESSMENT
The patient is an 89 year old male with a history of HTN, HL, hypothyroidism who presents with shortness of breath, cough.    Plan:  - ECG with sinus rhythm and atrial ectopy  - CT chest with no PNA  -   - Continue metoprolol succinate 50 mg daily  - Continue simvastatin 20 mg daily  - Hold triamterene-HCTZ  - ID and ortho follow-up  - Arthrocentesis results consistent with pseudogout

## 2024-07-27 NOTE — PROGRESS NOTE ADULT - SUBJECTIVE AND OBJECTIVE BOX
Date/Time Patient Seen:  		  Referring MD:   Data Reviewed	       Patient is a 89y old  Male who presents with a chief complaint of Left knee swelling & pain. (26 Jul 2024 13:14)      Subjective/HPI     PAST MEDICAL & SURGICAL HISTORY:  No pertinent past medical history    No significant past surgical history          Medication list         MEDICATIONS  (STANDING):  colchicine 0.6 milliGRAM(s) Oral two times a day  enoxaparin Injectable 40 milliGRAM(s) SubCutaneous every 24 hours  levothyroxine 25 MICROGram(s) Oral daily  metoprolol succinate ER 50 milliGRAM(s) Oral daily  simvastatin 20 milliGRAM(s) Oral at bedtime    MEDICATIONS  (PRN):  acetaminophen     Tablet .. 650 milliGRAM(s) Oral every 6 hours PRN Temp greater or equal to 38C (100.4F), Mild Pain (1 - 3)  guaiFENesin Oral Liquid (Sugar-Free) 200 milliGRAM(s) Oral every 6 hours PRN Cough         Vitals log        ICU Vital Signs Last 24 Hrs  T(C): 36.6 (27 Jul 2024 05:08), Max: 37.2 (26 Jul 2024 20:43)  T(F): 97.8 (27 Jul 2024 05:08), Max: 98.9 (26 Jul 2024 20:43)  HR: 74 (27 Jul 2024 05:08) (70 - 74)  BP: 125/74 (27 Jul 2024 05:08) (118/67 - 132/72)  BP(mean): --  ABP: --  ABP(mean): --  RR: 18 (27 Jul 2024 05:08) (18 - 18)  SpO2: 94% (27 Jul 2024 05:08) (93% - 94%)    O2 Parameters below as of 27 Jul 2024 05:08  Patient On (Oxygen Delivery Method): room air                 Input and Output:  I&O's Detail    25 Jul 2024 07:01  -  26 Jul 2024 07:00  --------------------------------------------------------  IN:    Lactated Ringers Bolus: 1000 mL  Total IN: 1000 mL    OUT:    Indwelling Catheter - Urethral (mL): 700 mL  Total OUT: 700 mL    Total NET: 300 mL      26 Jul 2024 07:01  -  27 Jul 2024 06:07  --------------------------------------------------------  IN:  Total IN: 0 mL    OUT:    Indwelling Catheter - Urethral (mL): 700 mL  Total OUT: 700 mL    Total NET: -700 mL          Lab Data                        13.3   9.99  )-----------( 185      ( 26 Jul 2024 06:00 )             41.7     07-26    138  |  103  |  16  ----------------------------<  99  4.6   |  30  |  0.85    Ca    9.5      26 Jul 2024 06:00    TPro  6.8  /  Alb  2.3<L>  /  TBili  0.8  /  DBili  x   /  AST  26  /  ALT  <10<L>  /  AlkPhos  73  07-25            Review of Systems	      Objective     Physical Examination    heart s1s2  lung dec BS  head nc      Pertinent Lab findings & Imaging      Aj:  NO   Adequate UO     I&O's Detail    25 Jul 2024 07:01  -  26 Jul 2024 07:00  --------------------------------------------------------  IN:    Lactated Ringers Bolus: 1000 mL  Total IN: 1000 mL    OUT:    Indwelling Catheter - Urethral (mL): 700 mL  Total OUT: 700 mL    Total NET: 300 mL      26 Jul 2024 07:01  -  27 Jul 2024 06:07  --------------------------------------------------------  IN:  Total IN: 0 mL    OUT:    Indwelling Catheter - Urethral (mL): 700 mL  Total OUT: 700 mL    Total NET: -700 mL               Discussed with:     Cultures:	        Radiology

## 2024-07-27 NOTE — PROGRESS NOTE ADULT - SUBJECTIVE AND OBJECTIVE BOX
Subjective: Patient seen and examined. No overnight events. Voiding on his own with removal of Rocha Catheter.     MEDICATIONS  (STANDING):  colchicine 0.6 milliGRAM(s) Oral two times a day  enoxaparin Injectable 40 milliGRAM(s) SubCutaneous every 24 hours  levothyroxine 25 MICROGram(s) Oral daily  metoprolol succinate ER 50 milliGRAM(s) Oral daily  simvastatin 20 milliGRAM(s) Oral at bedtime    MEDICATIONS  (PRN):  acetaminophen     Tablet .. 650 milliGRAM(s) Oral every 6 hours PRN Temp greater or equal to 38C (100.4F), Mild Pain (1 - 3)  guaiFENesin Oral Liquid (Sugar-Free) 200 milliGRAM(s) Oral every 6 hours PRN Cough      Vital Signs Last 24 Hrs  T(C): 36.6 (27 Jul 2024 05:08), Max: 37.2 (26 Jul 2024 20:43)  T(F): 97.8 (27 Jul 2024 05:08), Max: 98.9 (26 Jul 2024 20:43)  HR: 74 (27 Jul 2024 05:08) (70 - 74)  BP: 125/74 (27 Jul 2024 05:08) (118/67 - 132/72)  BP(mean): --  RR: 18 (27 Jul 2024 05:08) (18 - 18)  SpO2: 94% (27 Jul 2024 05:08) (93% - 94%)    Parameters below as of 27 Jul 2024 05:08  Patient On (Oxygen Delivery Method): room air        PHYSICAL EXAM:  GENERAL: NAD  HEAD:  Atraumatic, Normocephalic  ENMT: Moist mucous membranes  NECK: Supple, No JVD, Normal thyroid  CHEST/LUNG: Clear to auscultation bilaterally  HEART: Regular rate and rhythm  ABDOMEN: Soft, Nontender, Nondistended  EXTREMITIES:  2+ Peripheral Pulses      LABS:      Ca    9.5        26 Jul 2024 06:00        Urinalysis Basic - ( 26 Jul 2024 06:00 )    Color: x / Appearance: x / SG: x / pH: x  Gluc: 99 mg/dL / Ketone: x  / Bili: x / Urobili: x   Blood: x / Protein: x / Nitrite: x   Leuk Esterase: x / RBC: x / WBC x   Sq Epi: x / Non Sq Epi: x / Bacteria: x      CAPILLARY BLOOD GLUCOSE

## 2024-07-27 NOTE — DIETITIAN INITIAL EVALUATION ADULT - REASON FOR ADMISSION
Fever due to unspecified condition    Per HPI: This is an 89 y/o M with PMH of HTN, Dyslipidemia, and Hypothyroidism who presented from [facility] with left knee swelling & pain associated with generalized weakness. No reported fever or chills prior to presentation, denies knee trauma or falls, states that he has the above complaints "for a while". At the ED he was found with fever of 103.2 F with evidence of sepsis, seen by Ortho, an arthrocentesis was performed, and patient was started on antibiotics. Currently denies any pain. (24 Jul 2024 06:37)

## 2024-07-27 NOTE — DIETITIAN INITIAL EVALUATION ADULT - ORAL INTAKE PTA/DIET HISTORY
Unable to obtain diet/weight hx from pt at this time. Pt is poor historian, lethargic, with altered mental status.     No wts noted per HIE Unable to obtain full diet/weight hx from pt at this time. Pt is poor historian, lethargic, with altered mental status. Pt has 3 meals daily and cooks for self. Unsure of UBW and endorses some purposeful wt loss recently for health but unsure how much. Takes MVI PTA.    No wts noted per HIE

## 2024-07-28 LAB
ANION GAP SERPL CALC-SCNC: 8 MMOL/L — SIGNIFICANT CHANGE UP (ref 5–17)
BUN SERPL-MCNC: 20 MG/DL — SIGNIFICANT CHANGE UP (ref 7–23)
CALCIUM SERPL-MCNC: 9.9 MG/DL — SIGNIFICANT CHANGE UP (ref 8.4–10.5)
CHLORIDE SERPL-SCNC: 104 MMOL/L — SIGNIFICANT CHANGE UP (ref 96–108)
CO2 SERPL-SCNC: 25 MMOL/L — SIGNIFICANT CHANGE UP (ref 22–31)
CREAT SERPL-MCNC: 0.91 MG/DL — SIGNIFICANT CHANGE UP (ref 0.5–1.3)
EGFR: 82 ML/MIN/1.73M2 — SIGNIFICANT CHANGE UP
GLUCOSE SERPL-MCNC: 114 MG/DL — HIGH (ref 70–99)
POTASSIUM SERPL-MCNC: 4.1 MMOL/L — SIGNIFICANT CHANGE UP (ref 3.5–5.3)
POTASSIUM SERPL-SCNC: 4.1 MMOL/L — SIGNIFICANT CHANGE UP (ref 3.5–5.3)
SODIUM SERPL-SCNC: 137 MMOL/L — SIGNIFICANT CHANGE UP (ref 135–145)

## 2024-07-28 PROCEDURE — 99232 SBSQ HOSP IP/OBS MODERATE 35: CPT

## 2024-07-28 RX ADMIN — COLCHICINE 0.6 MILLIGRAM(S): 0.6 TABLET, FILM COATED ORAL at 06:25

## 2024-07-28 RX ADMIN — COLCHICINE 0.6 MILLIGRAM(S): 0.6 TABLET, FILM COATED ORAL at 17:02

## 2024-07-28 RX ADMIN — Medication 50 MILLIGRAM(S): at 06:25

## 2024-07-28 RX ADMIN — Medication 20 MILLIGRAM(S): at 21:43

## 2024-07-28 RX ADMIN — ENOXAPARIN SODIUM 40 MILLIGRAM(S): 120 INJECTION SUBCUTANEOUS at 11:21

## 2024-07-28 RX ADMIN — Medication 25 MICROGRAM(S): at 06:25

## 2024-07-28 NOTE — PROGRESS NOTE ADULT - SUBJECTIVE AND OBJECTIVE BOX
GABRIELA APPLE is a [facility] , patient examined and chart reviewed.    INTERVAL HPI/ OVERNIGHT EVENTS:   Afebrile. No events.  In chair.    PAST MEDICAL & SURGICAL HISTORY:  No pertinent past medical history      No significant past surgical history      For details regarding the patient's social history, family history, and other miscellaneous elements, please refer the initial infectious diseases consultation and/or the admitting history and physical examination for this admission.    ROS:  CONSTITUTIONAL:  Negative fever or chills  EYES:  Negative  blurry vision or double vision  CARDIOVASCULAR:  Negative for chest pain or palpitations  RESPIRATORY:  Negative for cough, wheezing, or SOB   GASTROINTESTINAL:  Negative for nausea, vomiting, diarrhea, constipation, or abdominal pain  GENITOURINARY:  Negative frequency, urgency or dysuria  NEUROLOGIC:  No headache, confusion, dizziness, lightheadedness  All other systems were reviewed and are negative     No Known Allergies      Current inpatient medications :    ANTIBIOTICS/RELEVANT:    MEDICATIONS  (STANDING):  colchicine 0.6 milliGRAM(s) Oral two times a day  enoxaparin Injectable 40 milliGRAM(s) SubCutaneous every 24 hours  levothyroxine 25 MICROGram(s) Oral daily  metoprolol succinate ER 50 milliGRAM(s) Oral daily  simvastatin 20 milliGRAM(s) Oral at bedtime    MEDICATIONS  (PRN):  acetaminophen     Tablet .. 650 milliGRAM(s) Oral every 6 hours PRN Temp greater or equal to 38C (100.4F), Mild Pain (1 - 3)  guaiFENesin Oral Liquid (Sugar-Free) 200 milliGRAM(s) Oral every 6 hours PRN Cough      Objective:  Vital Signs Last 24 Hrs  T(C): 36.4 (28 Jul 2024 14:29), Max: 36.6 (27 Jul 2024 20:40)  T(F): 97.5 (28 Jul 2024 14:29), Max: 97.8 (27 Jul 2024 20:40)  HR: 67 (28 Jul 2024 14:29) (67 - 73)  BP: 160/69 (28 Jul 2024 14:29) (150/81 - 161/74)  RR: 18 (28 Jul 2024 14:29) (17 - 18)  SpO2: 97% (28 Jul 2024 14:29) (92% - 97%)    Parameters below as of 28 Jul 2024 14:29  Patient On (Oxygen Delivery Method): room air    Physical Exam:  General:  no acute distress  Neck: supple, trachea midline  Lungs: clear, no wheeze/rhonchi  Cardiovascular: regular rate and rhythm, S1 S2  Abdomen: soft, nontender,  bowel sounds normal  Neurological: alert and oriented x3  Skin: no rash  Extremities: Left knee swelling- decreased  no erythema no pain    LABS:      Cell Count, Body Fluid (07.23.24 @ 20:50)    Tube Type: Lavender   Fluid Type: Other   Body Fluid Appearance: Turbid   Color - Body Fluid: Yellow   Total Nucleated Cell Count, Body Fluid: 25105: Reference Ranges:  Synovial Fluid  Total nucleated cells < 150 cells/uL  Neutrophils <25%  Lymphocytes 10-15%  Monocytes/Macrophages </=70%  Other parameters- No established reference ranges.  Bronchoalveolar lavage  Macrophages >85%  Lymphocytes 10-15%  Neutrophils <3%  Eosinophils <1%  Other parameters- No established reference ranges.  Peritoneal/pleural/pericardial fluid/other body fluid types  No established reference ranges. /uL   Total RBC Count: 38971 /uL   Neutrophils-Body Fluid: 60 %   BF Lymphocytes: 1 %   Monocyte/Macrophage Count - Body Fluid: 39 %    Culture - Joint (collected 23 Jul 2024 20:50)  Crystals, Synovial Fluid (07.23.24 @ 20:50)    Synovial Crystals Clarity: Turbid   Synovial Crystals Tube: Red Top Tube   Synovial Crystals Color: Yellow   Synovial Crystals Id: Crystals Present Intracellular CPPD (calcium pyrophosphate) present.      MICROBIOLOGY:    Source: Knee  Gram Stain (24 Jul 2024 20:52):    Moderate polymorphonuclear leukocytes per low power field    No organisms seen    Culture - Blood (collected 23 Jul 2024 16:58)  Source: .Blood Blood-Peripheral  Preliminary Report (25 Jul 2024 01:03):    No growth at 24 hours    Culture - Blood (collected 23 Jul 2024 16:58)  Source: .Blood Blood-Peripheral  Preliminary Report (25 Jul 2024 01:03):    No growth at 24 hours      RADIOLOGY & ADDITIONAL STUDIES:  ACC: [id number] EXAM:  CT KNEE ONLY LT   ORDERED BY: SAIRA EM     PROCEDURE DATE:  07/23/2024          INTERPRETATION:  INDICATION:  pain, swelling    TECHNIQUE: CT of the left knee without contrast with axial, sagittal, and   coronal multiplanar reformats.    Comparison:Knee radiographs dated 7/23/2024    FINDINGS:    No acute displaced fracture or dislocation. Tricompartmental knee   osteoarthritis with areas of chondrocalcinosis and joint bodies within   the suprapatellar recess of the knee joint space. Enthesopathic changes   at the quadriceps tendon and insertion and patellar tendon origin.   Nonspecific large knee joint effusion. Trace Stearns's cyst.    Atherosclerotic calcifications are noted. Mild subcutaneous edema about   the anterior knee.    IMPRESSION:    No acute displaced fracture or dislocation.    Nonspecific large knee joint effusion. If there is clinical concern for   septic arthritis of the knee, knee joint aspiration should be performed.    Tricompartmental knee osteoarthritis with areas of chondrocalcinosis and   joint bodies.      ACC: [id number] EXAM:  CT CHEST   ORDERED BY: SAIRA EM     PROCEDURE DATE:  07/23/2024          INTERPRETATION:  Clinical information: Cough.    CT scan of the chest was obtained without administration of intravenous   contrast.    No hilaror mediastinal adenopathy is noted.    Heart is normal in size. Calcification of the coronary arteries noted. No   pericardial effusion is noted.    No endobronchial lesions are noted. Lungs are clear. No pleural effusions   are noted.    Below the diaphragm, visualized portions of the abdomen are unremarkable.    Degenerative changes of the spine are noted.    IMPRESSION: There is no pneumonia.    Assessment :   89YO M PMH of HTN, Dyslipidemia, and Hypothyroidism resident of [facility] who presented to the hospital with c/o left knee swelling & pain associated with generalized weakness. Denies difficulty walking.  In ED he was found with fever of 103.2 F. Seen by Ortho, an arthrocentesis was performed.  Knee aspirate +Intracellular CPPD (calcium pyrophosphate) present- Likely pseudogout   Fluid culture NGTD  Septic joint ruled out  Rocha placed 7/25 sec AUR.  Rocha dc'd today Pt voiding  Clinically stable, doing well    Plan :   Supportive care  Trend temps and cbc  Pulm toileting  Monitor for retention  Asp precautions  Stable from ID standpoint  Dc planning per primary team    Continue with present regiment.  Appropriate use of antibiotics and adverse effects reviewed.      > 35 minutes were spent in direct patient care reviewing notes, medications ,labs data/ imaging , discussion with multidisciplinary team.    Thank you for allowing me to participate in care of your patient .    Holly Contreras MD  Infectious Disease  [phone]

## 2024-07-28 NOTE — PROGRESS NOTE ADULT - SUBJECTIVE AND OBJECTIVE BOX
Date/Time Patient Seen:  		  Referring MD:   Data Reviewed	       Patient is a 89y old  Male who presents with a chief complaint of Left knee swelling & pain. (27 Jul 2024 22:25)      Subjective/HPI     PAST MEDICAL & SURGICAL HISTORY:  No pertinent past medical history    No significant past surgical history          Medication list         MEDICATIONS  (STANDING):  colchicine 0.6 milliGRAM(s) Oral two times a day  enoxaparin Injectable 40 milliGRAM(s) SubCutaneous every 24 hours  levothyroxine 25 MICROGram(s) Oral daily  metoprolol succinate ER 50 milliGRAM(s) Oral daily  simvastatin 20 milliGRAM(s) Oral at bedtime    MEDICATIONS  (PRN):  acetaminophen     Tablet .. 650 milliGRAM(s) Oral every 6 hours PRN Temp greater or equal to 38C (100.4F), Mild Pain (1 - 3)  guaiFENesin Oral Liquid (Sugar-Free) 200 milliGRAM(s) Oral every 6 hours PRN Cough         Vitals log        ICU Vital Signs Last 24 Hrs  T(C): 36.4 (28 Jul 2024 05:02), Max: 37.2 (27 Jul 2024 13:27)  T(F): 97.5 (28 Jul 2024 05:02), Max: 98.9 (27 Jul 2024 13:27)  HR: 70 (28 Jul 2024 05:02) (67 - 73)  BP: 161/74 (28 Jul 2024 05:02) (141/80 - 161/74)  BP(mean): --  ABP: --  ABP(mean): --  RR: 17 (28 Jul 2024 05:02) (17 - 18)  SpO2: 92% (28 Jul 2024 05:02) (92% - 94%)    O2 Parameters below as of 28 Jul 2024 05:02  Patient On (Oxygen Delivery Method): room air                 Input and Output:  I&O's Detail    26 Jul 2024 07:01  -  27 Jul 2024 07:00  --------------------------------------------------------  IN:  Total IN: 0 mL    OUT:    Indwelling Catheter - Urethral (mL): 700 mL  Total OUT: 700 mL    Total NET: -700 mL          Lab Data                  Review of Systems	      Objective     Physical Examination    heart s1s2  lung dc BS  head nc      Pertinent Lab findings & Imaging      Aj:  NO   Adequate UO     I&O's Detail    26 Jul 2024 07:01  -  27 Jul 2024 07:00  --------------------------------------------------------  IN:  Total IN: 0 mL    OUT:    Indwelling Catheter - Urethral (mL): 700 mL  Total OUT: 700 mL    Total NET: -700 mL               Discussed with:     Cultures:	        Radiology

## 2024-07-28 NOTE — PROGRESS NOTE ADULT - SUBJECTIVE AND OBJECTIVE BOX
Chief Complaint: Shortness of breath, cough    Interval Events: No events overnight.    Review of Systems:  General: No fevers, chills, weight gain  Skin: No rashes, color changes  Cardiovascular: No chest pain, orthopnea  Respiratory: No shortness of breath, cough  Gastrointestinal: No nausea, abdominal pain  Genitourinary: No incontinence, pain with urination  Musculoskeletal: No pain, swelling, decreased range of motion  Neurological: No headache, weakness  Psychiatric: No depression, anxiety  Endocrine: No weight gain, increased thirst  All other systems are comprehensively negative.    Physical Exam:  Vital Signs Last 24 Hrs  T(C): 36.4 (28 Jul 2024 05:02), Max: 37.2 (27 Jul 2024 13:27)  T(F): 97.5 (28 Jul 2024 05:02), Max: 98.9 (27 Jul 2024 13:27)  HR: 70 (28 Jul 2024 05:02) (67 - 73)  BP: 161/74 (28 Jul 2024 05:02) (141/80 - 161/74)  BP(mean): --  RR: 17 (28 Jul 2024 05:02) (17 - 18)  SpO2: 92% (28 Jul 2024 05:02) (92% - 94%)  Parameters below as of 28 Jul 2024 05:02  Patient On (Oxygen Delivery Method): room air  General: NAD  HEENT: MMM  Neck: No JVD, no carotid bruit  Lungs: CTAB  CV: RRR, nl S1/S2, no M/R/G  Abdomen: S/NT/ND, +BS  Extremities: No LE edema, no cyanosis  Neuro: AAOx3, non-focal  Skin: No rash    Labs:    07-28    137  |  104  |  20  ----------------------------<  114<H>  4.1   |  25  |  0.91    Ca    9.9      28 Jul 2024 06:00        ECG/Telemetry: Sinus rhythm

## 2024-07-28 NOTE — PROGRESS NOTE ADULT - ASSESSMENT
89-year-old male brought in by ambulance from [facility] due to left knee pain and swelling.  Patient was found to have a fever in the ER.  Patient notes he has chronically pain to the left knee.  Patient reports experiencing a cough.    eval crystal arthropathy  eval septic joint  cough  OP  OA    on colchicine  vs noted  pseudogout    ortho eval noted  pain assessment  cough rx regimen  cx - biomarkers  ct chest clear  knee imaging reviewed  monitor VS and Sat  GOC discussion

## 2024-07-28 NOTE — PROGRESS NOTE ADULT - SUBJECTIVE AND OBJECTIVE BOX
Subjective: Patient seen and examined. Confused but calm. Pain under control.     MEDICATIONS  (STANDING):  colchicine 0.6 milliGRAM(s) Oral two times a day  enoxaparin Injectable 40 milliGRAM(s) SubCutaneous every 24 hours  levothyroxine 25 MICROGram(s) Oral daily  metoprolol succinate ER 50 milliGRAM(s) Oral daily  simvastatin 20 milliGRAM(s) Oral at bedtime    MEDICATIONS  (PRN):  acetaminophen     Tablet .. 650 milliGRAM(s) Oral every 6 hours PRN Temp greater or equal to 38C (100.4F), Mild Pain (1 - 3)  guaiFENesin Oral Liquid (Sugar-Free) 200 milliGRAM(s) Oral every 6 hours PRN Cough      Vital Signs Last 24 Hrs  T(C): 36.4 (28 Jul 2024 05:02), Max: 37.2 (27 Jul 2024 13:27)  T(F): 97.5 (28 Jul 2024 05:02), Max: 98.9 (27 Jul 2024 13:27)  HR: 70 (28 Jul 2024 05:02) (67 - 73)  BP: 161/74 (28 Jul 2024 05:02) (141/80 - 161/74)  BP(mean): --  RR: 17 (28 Jul 2024 05:02) (17 - 18)  SpO2: 92% (28 Jul 2024 05:02) (92% - 94%)    Parameters below as of 28 Jul 2024 05:02  Patient On (Oxygen Delivery Method): room air        PHYSICAL EXAM:  GENERAL: NAD  HEAD:  Atraumatic, Normocephalic  ENMT: Moist mucous membranes  NECK: Supple, No JVD, Normal thyroid  CHEST/LUNG: Clear to auscultation bilaterally  HEART: Regular rate and rhythm  ABDOMEN: Soft, Nontender, Nondistended  EXTREMITIES:  2+ Peripheral Pulses      LABS:    28 Jul 2024 06:00    137    |  104    |  20     ----------------------------<  114    4.1     |  25     |  0.91     Ca    9.9        28 Jul 2024 06:00        Urinalysis Basic - ( 28 Jul 2024 06:00 )    Color: x / Appearance: x / SG: x / pH: x  Gluc: 114 mg/dL / Ketone: x  / Bili: x / Urobili: x   Blood: x / Protein: x / Nitrite: x   Leuk Esterase: x / RBC: x / WBC x   Sq Epi: x / Non Sq Epi: x / Bacteria: x      CAPILLARY BLOOD GLUCOSE

## 2024-07-28 NOTE — PROGRESS NOTE ADULT - ASSESSMENT
89M HTN, HLD, Hypothyroidism admitted for Knee Swelling    Pseudogout   Confirmed S/P Arthrocentesis  Colchicine BID and will stop in 5 days  Monitor BM  Antibiotics discontinued    Acute Urinary Retention  Resolved and Rocha removed    HTN / HLD  Controlled  Metoprolol + HLD    Hypothyroidism  Synthroid    Diet  Regular    DVT Prophylaxis  Lovenox    Disposition   Patient will be discharged back to [facility] tomorrow

## 2024-07-29 LAB
ANION GAP SERPL CALC-SCNC: 12 MMOL/L — SIGNIFICANT CHANGE UP (ref 5–17)
BUN SERPL-MCNC: 23 MG/DL — SIGNIFICANT CHANGE UP (ref 7–23)
CALCIUM SERPL-MCNC: 9.6 MG/DL — SIGNIFICANT CHANGE UP (ref 8.4–10.5)
CHLORIDE SERPL-SCNC: 103 MMOL/L — SIGNIFICANT CHANGE UP (ref 96–108)
CO2 SERPL-SCNC: 25 MMOL/L — SIGNIFICANT CHANGE UP (ref 22–31)
CREAT SERPL-MCNC: 1 MG/DL — SIGNIFICANT CHANGE UP (ref 0.5–1.3)
CULTURE RESULTS: SIGNIFICANT CHANGE UP
CULTURE RESULTS: SIGNIFICANT CHANGE UP
EGFR: 73 ML/MIN/1.73M2 — SIGNIFICANT CHANGE UP
GLUCOSE BLDC GLUCOMTR-MCNC: 182 MG/DL — HIGH (ref 70–99)
GLUCOSE SERPL-MCNC: 144 MG/DL — HIGH (ref 70–99)
HCT VFR BLD CALC: 38.1 % — LOW (ref 39–50)
HGB BLD-MCNC: 12.1 G/DL — LOW (ref 13–17)
MCHC RBC-ENTMCNC: 29.2 PG — SIGNIFICANT CHANGE UP (ref 27–34)
MCHC RBC-ENTMCNC: 31.8 GM/DL — LOW (ref 32–36)
MCV RBC AUTO: 92 FL — SIGNIFICANT CHANGE UP (ref 80–100)
NRBC # BLD: 0 /100 WBCS — SIGNIFICANT CHANGE UP (ref 0–0)
PLATELET # BLD AUTO: 243 K/UL — SIGNIFICANT CHANGE UP (ref 150–400)
POTASSIUM SERPL-MCNC: 4.7 MMOL/L — SIGNIFICANT CHANGE UP (ref 3.5–5.3)
POTASSIUM SERPL-SCNC: 4.7 MMOL/L — SIGNIFICANT CHANGE UP (ref 3.5–5.3)
RBC # BLD: 4.14 M/UL — LOW (ref 4.2–5.8)
RBC # FLD: 14.3 % — SIGNIFICANT CHANGE UP (ref 10.3–14.5)
SODIUM SERPL-SCNC: 140 MMOL/L — SIGNIFICANT CHANGE UP (ref 135–145)
SPECIMEN SOURCE: SIGNIFICANT CHANGE UP
SPECIMEN SOURCE: SIGNIFICANT CHANGE UP
WBC # BLD: 18.4 K/UL — HIGH (ref 3.8–10.5)
WBC # FLD AUTO: 18.4 K/UL — HIGH (ref 3.8–10.5)

## 2024-07-29 PROCEDURE — 99233 SBSQ HOSP IP/OBS HIGH 50: CPT

## 2024-07-29 PROCEDURE — 93010 ELECTROCARDIOGRAM REPORT: CPT

## 2024-07-29 RX ADMIN — COLCHICINE 0.6 MILLIGRAM(S): 0.6 TABLET, FILM COATED ORAL at 17:22

## 2024-07-29 RX ADMIN — ENOXAPARIN SODIUM 40 MILLIGRAM(S): 120 INJECTION SUBCUTANEOUS at 12:21

## 2024-07-29 RX ADMIN — Medication 20 MILLIGRAM(S): at 21:19

## 2024-07-29 RX ADMIN — Medication 25 MICROGRAM(S): at 05:21

## 2024-07-29 RX ADMIN — COLCHICINE 0.6 MILLIGRAM(S): 0.6 TABLET, FILM COATED ORAL at 05:20

## 2024-07-29 RX ADMIN — Medication 50 MILLIGRAM(S): at 05:20

## 2024-07-29 NOTE — DISCHARGE NOTE PROVIDER - HOSPITAL COURSE
89M HTN, HLD, Hypothyroidism admitted for Knee Swelling    Pseudogout   Confirmed S/P Arthrocentesis  Colchicine BID and will stop in 5 days (7/30/24)  Monitor BM  Antibiotics discontinued    Acute Urinary Retention  Resolved and Rocha removed    HTN / HLD  Controlled  Metoprolol + HLD    Hypothyroidism  Synthroid    Disposition   Patient will be discharged back to [facility] 89M HTN, HLD, Hypothyroidism admitted for left knee swelling found to have pseudogout Hospital course complicated by possible syncope.     On presentation, pt found to have left knee swelling, underwent arthrocentesis with results suggestive of pseudogout with +CPPP  Pt completed 5 days of colchicine on 7/31/2024 with significant clinical improvement     Hospital course also notable for RRT called 7/29 for near syncope after using the bathroom. He was also orthostatic bp +  Syncope likely vasovagal and in nature and also orthostatic   He was started on midodrine 5mg TID with significant clinical improvement    By day of dc pt feels well, has been evaluated by PT and ALEJANDRA recommended  Hold metoprolol and triamterene-HCTZ and wean off midodrine, as tolerated     Also noted to have urinary retention requiring straight cath x2. He was started on flomax at bedtime, bp tolerating.

## 2024-07-29 NOTE — PROGRESS NOTE ADULT - SUBJECTIVE AND OBJECTIVE BOX
Date/Time Patient Seen:  		  Referring MD:   Data Reviewed	       Patient is a 89y old  Male who presents with a chief complaint of Left knee swelling & pain. (28 Jul 2024 18:57)      Subjective/HPI     PAST MEDICAL & SURGICAL HISTORY:  No pertinent past medical history    No significant past surgical history          Medication list         MEDICATIONS  (STANDING):  colchicine 0.6 milliGRAM(s) Oral two times a day  enoxaparin Injectable 40 milliGRAM(s) SubCutaneous every 24 hours  levothyroxine 25 MICROGram(s) Oral daily  metoprolol succinate ER 50 milliGRAM(s) Oral daily  simvastatin 20 milliGRAM(s) Oral at bedtime    MEDICATIONS  (PRN):  acetaminophen     Tablet .. 650 milliGRAM(s) Oral every 6 hours PRN Temp greater or equal to 38C (100.4F), Mild Pain (1 - 3)  guaiFENesin Oral Liquid (Sugar-Free) 200 milliGRAM(s) Oral every 6 hours PRN Cough         Vitals log        ICU Vital Signs Last 24 Hrs  T(C): 36.7 (29 Jul 2024 05:01), Max: 36.7 (29 Jul 2024 05:01)  T(F): 98 (29 Jul 2024 05:01), Max: 98 (29 Jul 2024 05:01)  HR: 61 (29 Jul 2024 05:01) (58 - 67)  BP: 146/75 (29 Jul 2024 05:01) (146/75 - 160/69)  BP(mean): --  ABP: --  ABP(mean): --  RR: 17 (29 Jul 2024 05:01) (17 - 18)  SpO2: 95% (29 Jul 2024 05:01) (94% - 97%)    O2 Parameters below as of 29 Jul 2024 05:01  Patient On (Oxygen Delivery Method): room air                 Input and Output:  I&O's Detail      Lab Data    07-28    137  |  104  |  20  ----------------------------<  114<H>  4.1   |  25  |  0.91    Ca    9.9      28 Jul 2024 06:00              Review of Systems	      Objective     Physical Examination    heart s1s2  lung dc BS  head nc      Pertinent Lab findings & Imaging      Rocha:  NO   Adequate UO     I&O's Detail           Discussed with:     Cultures:	        Radiology

## 2024-07-29 NOTE — PROGRESS NOTE ADULT - SUBJECTIVE AND OBJECTIVE BOX
Chief Complaint: Shortness of breath, cough    Interval Events: Syncope this am.    Review of Systems:  General: No fevers, chills, weight gain  Skin: No rashes, color changes  Cardiovascular: No chest pain, orthopnea  Respiratory: No shortness of breath, cough  Gastrointestinal: No nausea, abdominal pain  Genitourinary: No incontinence, pain with urination  Musculoskeletal: No pain, swelling, decreased range of motion  Neurological: No headache, weakness  Psychiatric: No depression, anxiety  Endocrine: No weight gain, increased thirst  All other systems are comprehensively negative.    Physical Exam:  Vital Signs Last 24 Hrs  T(C): 36.7 (29 Jul 2024 05:01), Max: 36.7 (29 Jul 2024 05:01)  T(F): 98 (29 Jul 2024 05:01), Max: 98 (29 Jul 2024 05:01)  HR: 61 (29 Jul 2024 05:01) (58 - 67)  BP: 146/75 (29 Jul 2024 05:01) (146/75 - 160/69)  BP(mean): --  RR: 17 (29 Jul 2024 05:01) (17 - 18)  SpO2: 95% (29 Jul 2024 05:01) (94% - 97%)  Parameters below as of 29 Jul 2024 05:01  Patient On (Oxygen Delivery Method): room air  General: NAD  HEENT: MMM  Neck: No JVD, no carotid bruit  Lungs: CTAB  CV: RRR, nl S1/S2, no M/R/G  Abdomen: S/NT/ND, +BS  Extremities: No LE edema, no cyanosis  Neuro: AAOx3, non-focal  Skin: No rash    Labs:    07-28    137  |  104  |  20  ----------------------------<  114<H>  4.1   |  25  |  0.91    Ca    9.9      28 Jul 2024 06:00      ECG/Telemetry: Sinus rhythm

## 2024-07-29 NOTE — DISCHARGE NOTE PROVIDER - NSDCCPCAREPLAN_GEN_ALL_CORE_FT
PRINCIPAL DISCHARGE DIAGNOSIS  Diagnosis: Pseudogout  Assessment and Plan of Treatment: You were found to have knee swelling and concern for joint infection.  Fluid was removed, analyzed and determined this was due to Pseudogout. Colchicine was prescribed for 5 days and pain and swelling has improved.     PRINCIPAL DISCHARGE DIAGNOSIS  Diagnosis: Pseudogout  Assessment and Plan of Treatment: You were found to have knee swelling and concern for joint infection.  Fluid was removed, analyzed and determined this was due to Pseudogout. Colchicine was prescribed for 5 days and pain and swelling has improved.      SECONDARY DISCHARGE DIAGNOSES  Diagnosis: Syncope  Assessment and Plan of Treatment:

## 2024-07-29 NOTE — DISCHARGE NOTE PROVIDER - NSDCMRMEDTOKEN_GEN_ALL_CORE_FT
Levothroid 25 mcg (0.025 mg) oral tablet: 1 tab(s) orally once a day  Metoprolol Succinate ER 50 mg oral tablet, extended release: 1 tab(s) orally once a day  simvastatin 20 mg oral tablet: 1 tab(s) orally once a day (at bedtime)  triamterene-hydrochlorothiazide 37.5 mg-25 mg oral capsule: 1 cap(s) orally once a day   Levothroid 25 mcg (0.025 mg) oral tablet: 1 tab(s) orally once a day  midodrine 5 mg oral tablet: 1 tab(s) orally 3 times a day  simvastatin 20 mg oral tablet: 1 tab(s) orally once a day (at bedtime)  tamsulosin 0.4 mg oral capsule: 1 cap(s) orally once a day (at bedtime)

## 2024-07-29 NOTE — PROGRESS NOTE ADULT - ASSESSMENT
The patient is an 89 year old male with a history of HTN, HL, hypothyroidism who presents with shortness of breath, cough.    Plan:  - ECG with sinus rhythm and atrial ectopy  - CT chest with no PNA  -   - Continue metoprolol succinate 50 mg daily  - Continue simvastatin 20 mg daily  - Hold triamterene-HCTZ  - ID and ortho follow-up  - Arthrocentesis results consistent with pseudogout  - Syncope - occurring after bathroom use. Likely vasovagal in nature. Can give IV fluids for now. Check orthostatics.

## 2024-07-29 NOTE — DISCHARGE NOTE PROVIDER - ATTENDING DISCHARGE PHYSICAL EXAMINATION:
GENERAL: Appears well in NAD   HEAD:  Atraumatic, Normocephalic  NECK: Supple, No JVD   CHEST/LUNG: Clear to auscultation bilaterally  HEART: Regular rate and rhythm  ABDOMEN: Soft, Nontender, Nondistended  EXTREMITIES:  2+ Peripheral Pulses

## 2024-07-29 NOTE — CASE MANAGEMENT PROGRESS NOTE - NSCMPROGRESSNOTE_GEN_ALL_CORE
Per treatment team rounds pt. had a vasovagal episode this AM in the bathroom..  Not ready for d/c.  Per PT Alize this afternoon pt. is Nausea and vomited and weak for PT session and unable to walk much at session and recommending ALEJANDRA low BP 95/68 .  Pt. was independent at [facility] and has no DME in [address] [facility] [phone] [facility]. CM following

## 2024-07-29 NOTE — PROGRESS NOTE ADULT - SUBJECTIVE AND OBJECTIVE BOX
[facility] examined and chart reviewed.    INTERVAL HPI/ OVERNIGHT EVENTS:   Afebrile. No events.  In chair.    PAST MEDICAL & SURGICAL HISTORY:  No pertinent past medical history      No significant past surgical history      For details regarding the patient's social history, family history, and other miscellaneous elements, please refer the initial infectious diseases consultation and/or the admitting history and physical examination for this admission.    ROS:  CONSTITUTIONAL:  Negative fever or chills  EYES:  Negative  blurry vision or double vision  CARDIOVASCULAR:  Negative for chest pain or palpitations  RESPIRATORY:  Negative for cough, wheezing, or SOB   GASTROINTESTINAL:  Negative for nausea, vomiting, diarrhea, constipation, or abdominal pain  GENITOURINARY:  Negative frequency, urgency or dysuria  NEUROLOGIC:  No headache, confusion, dizziness, lightheadedness  All other systems were reviewed and are negative     No Known Allergies      Current inpatient medications :    ANTIBIOTICS/RELEVANT:    MEDICATIONS  (STANDING):  colchicine 0.6 milliGRAM(s) Oral two times a day  enoxaparin Injectable 40 milliGRAM(s) SubCutaneous every 24 hours  levothyroxine 25 MICROGram(s) Oral daily  metoprolol succinate ER 50 milliGRAM(s) Oral daily  simvastatin 20 milliGRAM(s) Oral at bedtime    MEDICATIONS  (PRN):  acetaminophen     Tablet .. 650 milliGRAM(s) Oral every 6 hours PRN Temp greater or equal to 38C (100.4F), Mild Pain (1 - 3)  guaiFENesin Oral Liquid (Sugar-Free) 200 milliGRAM(s) Oral every 6 hours PRN Cough      Objective:  Vital Signs Last 24 Hrs  T(C): 36.4 (29 Jul 2024 20:30), Max: 36.7 (29 Jul 2024 05:01)  T(F): 97.6 (29 Jul 2024 20:30), Max: 98 (29 Jul 2024 05:01)  HR: 59 (29 Jul 2024 16:48) (53 - 79)  BP: 116/73 (29 Jul 2024 20:30) (95/68 - 146/75)  RR: 18 (29 Jul 2024 20:30) (17 - 18)  SpO2: 93% (29 Jul 2024 20:30) (93% - 96%)    Parameters below as of 29 Jul 2024 20:30  Patient On (Oxygen Delivery Method): room air      Physical Exam:  General:  no acute distress  Neck: supple, trachea midline  Lungs: clear, no wheeze/rhonchi  Cardiovascular: regular rate and rhythm, S1 S2  Abdomen: soft, nontender,  bowel sounds normal  Neurological: alert and oriented x3  Skin: no rash  Extremities: Left knee swelling- decreased  no erythema no pain    LABS:                        12.1   18.40 )-----------( 243      ( 29 Jul 2024 16:00 )             38.1   07-29    140  |  103  |  23  ----------------------------<  144<H>  4.7   |  25  |  1.00    Ca    9.6      29 Jul 2024 16:00      Cell Count, Body Fluid (07.23.24 @ 20:50)    Tube Type: Lavender   Fluid Type: Other   Body Fluid Appearance: Turbid   Color - Body Fluid: Yellow   Total Nucleated Cell Count, Body Fluid: 68704: Reference Ranges:  Synovial Fluid  Total nucleated cells < 150 cells/uL  Neutrophils <25%  Lymphocytes 10-15%  Monocytes/Macrophages </=70%  Other parameters- No established reference ranges.  Bronchoalveolar lavage  Macrophages >85%  Lymphocytes 10-15%  Neutrophils <3%  Eosinophils <1%  Other parameters- No established reference ranges.  Peritoneal/pleural/pericardial fluid/other body fluid types  No established reference ranges. /uL   Total RBC Count: 14351 /uL   Neutrophils-Body Fluid: 60 %   BF Lymphocytes: 1 %   Monocyte/Macrophage Count - Body Fluid: 39 %    Culture - Joint (collected 23 Jul 2024 20:50)  Crystals, Synovial Fluid (07.23.24 @ 20:50)    Synovial Crystals Clarity: Turbid   Synovial Crystals Tube: Red Top Tube   Synovial Crystals Color: Yellow   Synovial Crystals Id: Crystals Present Intracellular CPPD (calcium pyrophosphate) present.      MICROBIOLOGY:    Source: Knee  Gram Stain (24 Jul 2024 20:52):    Moderate polymorphonuclear leukocytes per low power field    No organisms seen    Culture - Blood (collected 23 Jul 2024 16:58)  Source: .Blood Blood-Peripheral  Preliminary Report (25 Jul 2024 01:03):    No growth at 24 hours    Culture - Blood (collected 23 Jul 2024 16:58)  Source: .Blood Blood-Peripheral  Preliminary Report (25 Jul 2024 01:03):    No growth at 24 hours      RADIOLOGY & ADDITIONAL STUDIES:  ACC: [id number] EXAM:  CT KNEE ONLY LT   ORDERED BY: SAIRA EM     PROCEDURE DATE:  07/23/2024          INTERPRETATION:  INDICATION:  pain, swelling    TECHNIQUE: CT of the left knee without contrast with axial, sagittal, and   coronal multiplanar reformats.    Comparison:Knee radiographs dated 7/23/2024    FINDINGS:    No acute displaced fracture or dislocation. Tricompartmental knee   osteoarthritis with areas of chondrocalcinosis and joint bodies within   the suprapatellar recess of the knee joint space. Enthesopathic changes   at the quadriceps tendon and insertion and patellar tendon origin.   Nonspecific large knee joint effusion. Trace Stearns's cyst.    Atherosclerotic calcifications are noted. Mild subcutaneous edema about   the anterior knee.    IMPRESSION:    No acute displaced fracture or dislocation.    Nonspecific large knee joint effusion. If there is clinical concern for   septic arthritis of the knee, knee joint aspiration should be performed.    Tricompartmental knee osteoarthritis with areas of chondrocalcinosis and   joint bodies.      ACC: [id number] EXAM:  CT CHEST   ORDERED BY: SAIRA EM     PROCEDURE DATE:  07/23/2024          INTERPRETATION:  Clinical information: Cough.    CT scan of the chest was obtained without administration of intravenous   contrast.    No hilaror mediastinal adenopathy is noted.    Heart is normal in size. Calcification of the coronary arteries noted. No   pericardial effusion is noted.    No endobronchial lesions are noted. Lungs are clear. No pleural effusions   are noted.    Below the diaphragm, visualized portions of the abdomen are unremarkable.    Degenerative changes of the spine are noted.    IMPRESSION: There is no pneumonia.    Assessment :   89YO M PMH of HTN, Dyslipidemia, and Hypothyroidism resident of [facility] who presented to the hospital with c/o left knee swelling & pain associated with generalized weakness. Denies difficulty walking.  In ED he was found with fever of 103.2 F. Seen by Ortho, an arthrocentesis was performed.  Knee aspirate +Intracellular CPPD (calcium pyrophosphate) present- Likely pseudogout   Fluid culture NGTD  Septic joint ruled out  Rocha placed 7/25 sec AUR.  Rocha dc'd 7/28 Pt voiding  WBC 18K today  Clinically stable, doing well    Plan :   Trend temps and cbc  Repeat cbc in am  Supportive care  Pulm toileting  Monitor for retention  Asp precautions  Stable from ID standpoint    Continue with present regiment.  Appropriate use of antibiotics and adverse effects reviewed.      > 35 minutes were spent in direct patient care reviewing notes, medications ,labs data/ imaging , discussion with multidisciplinary team.    Thank you for allowing me to participate in care of your patient .    Holly Contreras MD  Infectious Disease  [phone]

## 2024-07-29 NOTE — PROGRESS NOTE ADULT - ASSESSMENT
89-year-old male brought in by ambulance from [facility] due to left knee pain and swelling.  Patient was found to have a fever in the ER.  Patient notes he has chronically pain to the left knee.  Patient reports experiencing a cough.    eval crystal arthropathy  eval septic joint  cough  OP  OA    on colchicine  vs noted  pseudogout    ortho eval noted  pain assessment  cough rx regimen  cx - biomarkers  ct chest clear  knee imaging reviewed  monitor VS and Sat  GOC discussion

## 2024-07-29 NOTE — PROGRESS NOTE ADULT - ASSESSMENT
89M HTN, HLD, Hypothyroidism admitted for Knee Swelling    Pseudogout   Confirmed S/P Arthrocentesis  Colchicine BID and will stop in 5 days (7/31/24)  Monitor BM  Antibiotics discontinued    Acute Urinary Retention  Resolved and Rocha removed    HTN / HLD  Controlled  Metoprolol + HLD    Hypothyroidism  Synthroid    Diet  Regular    DVT Prophylaxis  Lovenox    Disposition   Patient will be discharged back to [facility]

## 2024-07-29 NOTE — PROGRESS NOTE ADULT - SUBJECTIVE AND OBJECTIVE BOX
Subjective: Patient seen and examined. No overnight events but this AM had a syncopal episode. Appears calm, alert and comfortable now.     MEDICATIONS  (STANDING):  colchicine 0.6 milliGRAM(s) Oral two times a day  enoxaparin Injectable 40 milliGRAM(s) SubCutaneous every 24 hours  levothyroxine 25 MICROGram(s) Oral daily  metoprolol succinate ER 50 milliGRAM(s) Oral daily  simvastatin 20 milliGRAM(s) Oral at bedtime    MEDICATIONS  (PRN):  acetaminophen     Tablet .. 650 milliGRAM(s) Oral every 6 hours PRN Temp greater or equal to 38C (100.4F), Mild Pain (1 - 3)  guaiFENesin Oral Liquid (Sugar-Free) 200 milliGRAM(s) Oral every 6 hours PRN Cough      Vital Signs Last 24 Hrs  T(C): 36.4 (29 Jul 2024 09:33), Max: 36.7 (29 Jul 2024 05:01)  T(F): 97.6 (29 Jul 2024 09:33), Max: 98 (29 Jul 2024 05:01)  HR: 53 (29 Jul 2024 09:33) (53 - 67)  BP: 136/68 (29 Jul 2024 09:33) (136/68 - 160/69)  BP(mean): --  RR: 18 (29 Jul 2024 09:33) (17 - 18)  SpO2: 96% (29 Jul 2024 09:33) (94% - 97%)    Parameters below as of 29 Jul 2024 05:01  Patient On (Oxygen Delivery Method): room air        PHYSICAL EXAM:  GENERAL: NAD  HEAD:  Atraumatic, Normocephalic  ENMT: Moist mucous membranes  NECK: Supple, No JVD, Normal thyroid  CHEST/LUNG: Clear to auscultation bilaterally  HEART: Regular rate and rhythm  ABDOMEN: Soft, Nontender, Nondistended  EXTREMITIES:  2+ Peripheral Pulses      LABS:      Ca    9.9        28 Jul 2024 06:00        Urinalysis Basic - ( 28 Jul 2024 06:00 )    Color: x / Appearance: x / SG: x / pH: x  Gluc: 114 mg/dL / Ketone: x  / Bili: x / Urobili: x   Blood: x / Protein: x / Nitrite: x   Leuk Esterase: x / RBC: x / WBC x   Sq Epi: x / Non Sq Epi: x / Bacteria: x      CAPILLARY BLOOD GLUCOSE      POCT Blood Glucose.: 182 mg/dL (29 Jul 2024 07:25)

## 2024-07-29 NOTE — CAREGIVER ENGAGEMENT NOTE - CAREGIVER EDUCATION NOTES - FREE TEXT
spoke with son gave 24 hour notice of discharge and he spoke with Dr. Rodriguez as well.   son verbalized good understanding.  States he will be available to transport pt. home on 7/30 or 7/31/24 .  CM team will let son know what date/ time is appropriate for .      CM called [facility] [address] [facility] [phone] spoke with Marlin gave anticipated d/c date of 7/3-0/24.    CM sent referral to [facility] with requested start of care 7/31/24.  will send d/c notes and face to face when available.  CM following.

## 2024-07-30 LAB
BASOPHILS # BLD AUTO: 0.05 K/UL — SIGNIFICANT CHANGE UP (ref 0–0.2)
BASOPHILS NFR BLD AUTO: 0.3 % — SIGNIFICANT CHANGE UP (ref 0–2)
EOSINOPHIL # BLD AUTO: 0.1 K/UL — SIGNIFICANT CHANGE UP (ref 0–0.5)
EOSINOPHIL NFR BLD AUTO: 0.7 % — SIGNIFICANT CHANGE UP (ref 0–6)
HCT VFR BLD CALC: 32.8 % — LOW (ref 39–50)
HGB BLD-MCNC: 10.5 G/DL — LOW (ref 13–17)
IMM GRANULOCYTES NFR BLD AUTO: 0.8 % — SIGNIFICANT CHANGE UP (ref 0–0.9)
LYMPHOCYTES # BLD AUTO: 16.2 % — SIGNIFICANT CHANGE UP (ref 13–44)
LYMPHOCYTES # BLD AUTO: 2.44 K/UL — SIGNIFICANT CHANGE UP (ref 1–3.3)
MCHC RBC-ENTMCNC: 29.2 PG — SIGNIFICANT CHANGE UP (ref 27–34)
MCHC RBC-ENTMCNC: 32 GM/DL — SIGNIFICANT CHANGE UP (ref 32–36)
MCV RBC AUTO: 91.1 FL — SIGNIFICANT CHANGE UP (ref 80–100)
MONOCYTES # BLD AUTO: 1.39 K/UL — HIGH (ref 0–0.9)
MONOCYTES NFR BLD AUTO: 9.2 % — SIGNIFICANT CHANGE UP (ref 2–14)
NEUTROPHILS # BLD AUTO: 11 K/UL — HIGH (ref 1.8–7.4)
NEUTROPHILS NFR BLD AUTO: 72.8 % — SIGNIFICANT CHANGE UP (ref 43–77)
NRBC # BLD: 0 /100 WBCS — SIGNIFICANT CHANGE UP (ref 0–0)
PLATELET # BLD AUTO: 286 K/UL — SIGNIFICANT CHANGE UP (ref 150–400)
RBC # BLD: 3.6 M/UL — LOW (ref 4.2–5.8)
RBC # FLD: 14.5 % — SIGNIFICANT CHANGE UP (ref 10.3–14.5)
WBC # BLD: 15.1 K/UL — HIGH (ref 3.8–10.5)
WBC # FLD AUTO: 15.1 K/UL — HIGH (ref 3.8–10.5)

## 2024-07-30 PROCEDURE — 99233 SBSQ HOSP IP/OBS HIGH 50: CPT

## 2024-07-30 RX ORDER — MIDODRINE HYDROCHLORIDE 2.5 MG/1
5 TABLET ORAL THREE TIMES A DAY
Refills: 0 | Status: DISCONTINUED | OUTPATIENT
Start: 2024-07-30 | End: 2024-07-31

## 2024-07-30 RX ADMIN — ENOXAPARIN SODIUM 40 MILLIGRAM(S): 120 INJECTION SUBCUTANEOUS at 12:40

## 2024-07-30 RX ADMIN — COLCHICINE 0.6 MILLIGRAM(S): 0.6 TABLET, FILM COATED ORAL at 17:02

## 2024-07-30 RX ADMIN — Medication 20 MILLIGRAM(S): at 21:54

## 2024-07-30 RX ADMIN — MIDODRINE HYDROCHLORIDE 5 MILLIGRAM(S): 2.5 TABLET ORAL at 17:01

## 2024-07-30 RX ADMIN — COLCHICINE 0.6 MILLIGRAM(S): 0.6 TABLET, FILM COATED ORAL at 06:03

## 2024-07-30 RX ADMIN — Medication 50 MILLIGRAM(S): at 06:03

## 2024-07-30 RX ADMIN — MIDODRINE HYDROCHLORIDE 5 MILLIGRAM(S): 2.5 TABLET ORAL at 12:40

## 2024-07-30 RX ADMIN — Medication 25 MICROGRAM(S): at 06:03

## 2024-07-30 NOTE — PROGRESS NOTE ADULT - ASSESSMENT
The patient is an 89 year old male with a history of HTN, HL, hypothyroidism who presents with shortness of breath, cough.    Plan:  - ECG with sinus rhythm and atrial ectopy  - CT chest with no PNA  -   - Continue metoprolol succinate 50 mg daily  - Continue simvastatin 20 mg daily  - Remain off triamterene-HCTZ  - ID and ortho follow-up  - Arthrocentesis results consistent with pseudogout  - Syncope - occurring after bathroom use. Likely vasovagal in nature. Can give IV fluids for now. Check orthostatics. If positive start midodrine 5 mg tid. The patient is an 89 year old male with a history of HTN, HL, hypothyroidism who presents with shortness of breath, cough.    Plan:  - ECG with sinus rhythm and atrial ectopy  - CT chest with no PNA  -   - Hold metoprolol  - Continue simvastatin 20 mg daily  - Remain off triamterene-HCTZ  - ID and ortho follow-up  - Arthrocentesis results consistent with pseudogout  - Syncope - occurring after bathroom use. Likely vasovagal in nature. Can give IV fluids for now. Check orthostatics. If positive start midodrine 5 mg tid.

## 2024-07-30 NOTE — PROGRESS NOTE ADULT - ASSESSMENT
89-year-old male brought in by ambulance from [facility] due to left knee pain and swelling.  Patient was found to have a fever in the ER.  Patient notes he has chronically pain to the left knee.  Patient reports experiencing a cough.    eval crystal arthropathy  eval septic joint  cough  OP  OA    on colchicine  vs noted  pseudogout    ortho eval noted  pain assessment  cough rx regimen  cx - biomarkers  ct chest clear  knee imaging reviewed  monitor VS and Sat  GOC discussion

## 2024-07-30 NOTE — PROGRESS NOTE ADULT - SUBJECTIVE AND OBJECTIVE BOX
RRT called yesterday for syncope thought to be likely vasovagal in nature   Pt was also noted to be symptomatic when partaking later in the day yesterday with PT reported to be nauseas and weak    This morning, the pt feels weak with decreased po intake  Denies dizziness at rest         MEDICATIONS  (STANDING):  colchicine 0.6 milliGRAM(s) Oral two times a day  enoxaparin Injectable 40 milliGRAM(s) SubCutaneous every 24 hours  levothyroxine 25 MICROGram(s) Oral daily  metoprolol succinate ER 50 milliGRAM(s) Oral daily  simvastatin 20 milliGRAM(s) Oral at bedtime    MEDICATIONS  (PRN):  acetaminophen     Tablet .. 650 milliGRAM(s) Oral every 6 hours PRN Temp greater or equal to 38C (100.4F), Mild Pain (1 - 3)  guaiFENesin Oral Liquid (Sugar-Free) 200 milliGRAM(s) Oral every 6 hours PRN Cough      Vital Signs Last 24 Hrs  T(C): 36.4 (29 Jul 2024 09:33), Max: 36.7 (29 Jul 2024 05:01)  T(F): 97.6 (29 Jul 2024 09:33), Max: 98 (29 Jul 2024 05:01)  HR: 53 (29 Jul 2024 09:33) (53 - 67)  BP: 136/68 (29 Jul 2024 09:33) (136/68 - 160/69)  BP(mean): --  RR: 18 (29 Jul 2024 09:33) (17 - 18)  SpO2: 96% (29 Jul 2024 09:33) (94% - 97%)    Parameters below as of 29 Jul 2024 05:01  Patient On (Oxygen Delivery Method): room air    PHYSICAL EXAM:  GENERAL: NAD  HEAD:  Atraumatic, Normocephalic  NECK: Supple, No JVD, Normal thyroid  CHEST/LUNG: Clear to auscultation bilaterally  HEART: Regular rate and rhythm  ABDOMEN: Soft, Nontender, Nondistended  EXTREMITIES:  2+ Peripheral Pulses      LABS:      Ca    9.9        28 Jul 2024 06:00        Urinalysis Basic - ( 28 Jul 2024 06:00 )    Color: x / Appearance: x / SG: x / pH: x  Gluc: 114 mg/dL / Ketone: x  / Bili: x / Urobili: x   Blood: x / Protein: x / Nitrite: x   Leuk Esterase: x / RBC: x / WBC x   Sq Epi: x / Non Sq Epi: x / Bacteria: x      CAPILLARY BLOOD GLUCOSE      POCT Blood Glucose.: 182 mg/dL (29 Jul 2024 07:25)         RRT called yesterday for syncope thought to be likely vasovagal in nature   Pt was also noted to be symptomatic when partaking later in the day yesterday with PT reported to be nauseas and weak    This morning, the pt denies dizziness at rest but feels weak with decreased po intake        MEDICATIONS  (STANDING):  colchicine 0.6 milliGRAM(s) Oral two times a day  enoxaparin Injectable 40 milliGRAM(s) SubCutaneous every 24 hours  levothyroxine 25 MICROGram(s) Oral daily  metoprolol succinate ER 50 milliGRAM(s) Oral daily  simvastatin 20 milliGRAM(s) Oral at bedtime    MEDICATIONS  (PRN):  acetaminophen     Tablet .. 650 milliGRAM(s) Oral every 6 hours PRN Temp greater or equal to 38C (100.4F), Mild Pain (1 - 3)  guaiFENesin Oral Liquid (Sugar-Free) 200 milliGRAM(s) Oral every 6 hours PRN Cough      Vital Signs Last 24 Hrs  T(C): 36.4 (29 Jul 2024 09:33), Max: 36.7 (29 Jul 2024 05:01)  T(F): 97.6 (29 Jul 2024 09:33), Max: 98 (29 Jul 2024 05:01)  HR: 53 (29 Jul 2024 09:33) (53 - 67)  BP: 136/68 (29 Jul 2024 09:33) (136/68 - 160/69)  BP(mean): --  RR: 18 (29 Jul 2024 09:33) (17 - 18)  SpO2: 96% (29 Jul 2024 09:33) (94% - 97%)    Parameters below as of 29 Jul 2024 05:01  Patient On (Oxygen Delivery Method): room air    PHYSICAL EXAM:  GENERAL: NAD  HEAD:  Atraumatic, Normocephalic  NECK: Supple, No JVD, Normal thyroid  CHEST/LUNG: Clear to auscultation bilaterally  HEART: Regular rate and rhythm  ABDOMEN: Soft, Nontender, Nondistended  EXTREMITIES:  2+ Peripheral Pulses      LABS:      Ca    9.9        28 Jul 2024 06:00        Urinalysis Basic - ( 28 Jul 2024 06:00 )    Color: x / Appearance: x / SG: x / pH: x  Gluc: 114 mg/dL / Ketone: x  / Bili: x / Urobili: x   Blood: x / Protein: x / Nitrite: x   Leuk Esterase: x / RBC: x / WBC x   Sq Epi: x / Non Sq Epi: x / Bacteria: x      CAPILLARY BLOOD GLUCOSE      POCT Blood Glucose.: 182 mg/dL (29 Jul 2024 07:25)

## 2024-07-30 NOTE — PROGRESS NOTE ADULT - SUBJECTIVE AND OBJECTIVE BOX
Date/Time Patient Seen:  		  Referring MD:   Data Reviewed	       Patient is a 89y old  Male who presents with a chief complaint of Left knee swelling & pain. (29 Jul 2024 14:02)      Subjective/HPI     PAST MEDICAL & SURGICAL HISTORY:  No pertinent past medical history    No significant past surgical history          Medication list         MEDICATIONS  (STANDING):  colchicine 0.6 milliGRAM(s) Oral two times a day  enoxaparin Injectable 40 milliGRAM(s) SubCutaneous every 24 hours  levothyroxine 25 MICROGram(s) Oral daily  metoprolol succinate ER 50 milliGRAM(s) Oral daily  simvastatin 20 milliGRAM(s) Oral at bedtime    MEDICATIONS  (PRN):  acetaminophen     Tablet .. 650 milliGRAM(s) Oral every 6 hours PRN Temp greater or equal to 38C (100.4F), Mild Pain (1 - 3)  guaiFENesin Oral Liquid (Sugar-Free) 200 milliGRAM(s) Oral every 6 hours PRN Cough         Vitals log        ICU Vital Signs Last 24 Hrs  T(C): 36.5 (30 Jul 2024 05:10), Max: 36.6 (29 Jul 2024 13:36)  T(F): 97.7 (30 Jul 2024 05:10), Max: 97.9 (29 Jul 2024 13:36)  HR: 77 (30 Jul 2024 05:10) (53 - 82)  BP: 131/66 (30 Jul 2024 05:10) (95/68 - 136/68)  BP(mean): --  ABP: --  ABP(mean): --  RR: 18 (30 Jul 2024 05:10) (18 - 18)  SpO2: 94% (30 Jul 2024 05:10) (93% - 96%)    O2 Parameters below as of 30 Jul 2024 05:10  Patient On (Oxygen Delivery Method): room air                 Input and Output:  I&O's Detail      Lab Data                        12.1   18.40 )-----------( 243      ( 29 Jul 2024 16:00 )             38.1     07-29    140  |  103  |  23  ----------------------------<  144<H>  4.7   |  25  |  1.00    Ca    9.6      29 Jul 2024 16:00              Review of Systems	      Objective     Physical Examination    heart s1s2  lung dc BS  head nc      Pertinent Lab findings & Imaging      Rocha:  NO   Adequate UO     I&O's Detail           Discussed with:     Cultures:	        Radiology

## 2024-07-30 NOTE — CASE MANAGEMENT PROGRESS NOTE - NSCMPROGRESSNOTE_GEN_ALL_CORE
Per treatment team rounds and Dr. Cano pt. not cleared for today.  Monitoring for weakness and some dizziness with  getting up.  will start Midodrine , orthostatic BP's, mobility limitations will need a RW.      Dr. Cano provided a RX RW and CM sent to [facility] [phone] Await response and delivery if pt. going home.  PT recommending [facility].    CM called [facility] [phone] [facility] left message for CM Alize jo to see what mobility status they will require for his return.    D/C plan pending pt. progress.   Per treatment team rounds and Dr. Cano pt. not cleared for today.  Monitoring for weakness and some dizziness with  getting up.  will start Midodrine , orthostatic BP's, mobility limitations will need a RW.      Dr. Cano provided a RX RW and CM sent to [facility] [phone] Await response and delivery if pt. going home.  PT recommending [facility].    CM called [facility] [phone] [facility] left message for CM Alize jo to see what mobility status they will require for his return.    D/C plan pending pt. progress.      CM received message from [facility] [phone] pt. has a copayment $10.64.      CM called left a message for son with CM contact info to discuss transition planning.    Spoke with Alize RIVERA at [facility] [phone] states pt. was independent with no assistive device PTA.  States pt. will need to ambulate at least 50 ft with a Rolling walker, if needed to return.      CM following Per treatment team rounds and Dr. Cano pt. not cleared for today.  Monitoring for weakness and some dizziness with  getting up.  will start Midodrine , orthostatic BP's, mobility limitations will need a RW.      Dr. Cano provided a RX RW and CM sent to [facility] [phone] Await response and delivery if pt. going home.  PT recommending [facility].    CM called [facility] [phone] [facility] left message for CM Alize jo to see what mobility status they will require for his return.    D/C plan pending pt. progress.      CM received message from [facility] [phone] pt. has a copayment $10.64.      CM called left a message for son with CM contact info to discuss transition planning.    Spoke with Alize RIVERA at [facility] [phone] states pt. was independent with no assistive device PTA.  States pt. will need to ambulate at least 50 ft with a Rolling walker, if needed to return.      CM following    ---  Addendum pt. son returned call discussed transition planning and PT recommending [facility].  Mauro states pt. has never been to [facility] before and he hopes he will be able to return to the assisted living when he is no longer dizzy when getting up.  Informed him GALILEO request he be able to walk 50 ft at least. to return.  Son wants to speak with MD to discuss medications and medical treatments.  CM sent Dr. Cano a Teams message with son contact information at 5: 25 pm.  Son agreeable to a call back tomorrow if not available tonight.  CM following.   Per treatment team rounds and Dr. Cano pt. not cleared for today.  Monitoring for weakness and some dizziness with  getting up.  will start Midodrine , orthostatic BP's, mobility limitations will need a RW.      Dr. Cano provided a RX RW and CM sent to [facility] [phone] Await response and delivery if pt. going home.  PT recommending [facility].    CM called [facility] [phone] [facility] left message for CM Alize jo to see what mobility status they will require for his return.    D/C plan pending pt. progress.      CM received message from [facility] [phone] pt. has a copayment $10.64.      CM called left a message for son with CM contact info to discuss transition planning.    Spoke with Alize RIVERA at [facility] [phone] states pt. was independent with no assistive device PTA.  States pt. will need to ambulate at least 50 ft with a Rolling walker, if needed to return.      CM following    ---  Addendum pt. son returned call discussed transition planning and PT recommending [facility].  Mauro states pt. has never been to [facility] before and he hopes he will be able to return to the assisted living when he is no longer dizzy when getting up.  Informed him GALILEO request he be able to walk 50 ft at least. to return.  Discussed RW if he goes home and home care PT .  RW copayment $10.64 agreeable to son  and [facility] preferred agency of [facility] [phone] . Son wants to speak with MD to discuss medications and medical treatments.  CM sent Dr. Cano a Teams message with son contact information at 5: 25 pm.  Son agreeable to a call back tomorrow if not available tonight.  CM following.

## 2024-07-30 NOTE — PROGRESS NOTE ADULT - SUBJECTIVE AND OBJECTIVE BOX
Chief Complaint: Shortness of breath, cough    Interval Events: No events overnight.    Review of Systems:  General: No fevers, chills, weight gain  Skin: No rashes, color changes  Cardiovascular: No chest pain, orthopnea  Respiratory: No shortness of breath, cough  Gastrointestinal: No nausea, abdominal pain  Genitourinary: No incontinence, pain with urination  Musculoskeletal: No pain, swelling, decreased range of motion  Neurological: No headache, weakness  Psychiatric: No depression, anxiety  Endocrine: No weight gain, increased thirst  All other systems are comprehensively negative.    Physical Exam:  Vital Signs Last 24 Hrs  T(C): 36.5 (30 Jul 2024 05:10), Max: 36.6 (29 Jul 2024 13:36)  T(F): 97.7 (30 Jul 2024 05:10), Max: 97.9 (29 Jul 2024 13:36)  HR: 77 (30 Jul 2024 05:10) (53 - 82)  BP: 131/66 (30 Jul 2024 05:10) (95/68 - 136/68)  BP(mean): --  RR: 18 (30 Jul 2024 05:10) (18 - 18)  SpO2: 94% (30 Jul 2024 05:10) (93% - 96%)  Parameters below as of 30 Jul 2024 05:10  Patient On (Oxygen Delivery Method): room air  General: NAD  HEENT: MMM  Neck: No JVD, no carotid bruit  Lungs: CTAB  CV: RRR, nl S1/S2, no M/R/G  Abdomen: S/NT/ND, +BS  Extremities: No LE edema, no cyanosis  Neuro: AAOx3, non-focal  Skin: No rash    Labs:    07-29    140  |  103  |  23  ----------------------------<  144<H>  4.7   |  25  |  1.00    Ca    9.6      29 Jul 2024 16:00                          10.5   15.10 )-----------( 286      ( 30 Jul 2024 07:52 )             32.8       ECG/Telemetry: Sinus rhythm

## 2024-07-30 NOTE — PROGRESS NOTE ADULT - ASSESSMENT
89M HTN, HLD, Hypothyroidism admitted for left knee swelling found to have pseudogout Hospital course complicated by possible vasovagal syncope     Syncope  Likely vasovagal. Obtain orthostatic vitals   Pt feels weak, administer 1L fluid bolus (after orthostatic vitals performed)  PT/OT     Pseudogout of left knee   Clinically better   S/P Arthrocentesis with CPPP  Colchicine BID and will stop in 5 days (7/31/24)    HTN / HLD  Controlled  Metoprolol + HLD    Hypothyroidism  Synthroid    DVT Prophylaxis: Lovenox    The pt has a mobility limitation that signigicantly impairs the ability to partake in one or more mobility related activities of daily living. THe pt is able to safely use the rolling walker and the functional mobility deficit can be resolved with the use of a rolling walker  89M HTN, HLD, Hypothyroidism admitted for left knee swelling found to have pseudogout Hospital course complicated by possible vasovagal syncope     Vasovagal syncope   Orthostatic BP +  Start midodrine 5mg TID    Pseudogout of left knee   Clinically better   S/P Arthrocentesis with CPPP  Colchicine BID and will stop in 5 days (7/31/24)    HTN / HLD  Controlled  Metoprolol + HLD    Hypothyroidism  Synthroid    DVT Prophylaxis: Lovenox    The pt has a mobility limitation that signigicantly impairs the ability to partake in one or more mobility related activities of daily living. THe pt is able to safely use the rolling walker and the functional mobility deficit can be resolved with the use of a rolling walker

## 2024-07-30 NOTE — PROGRESS NOTE ADULT - SUBJECTIVE AND OBJECTIVE BOX
[facility] patient examined and chart reviewed.    INTERVAL HPI/ OVERNIGHT EVENTS:   Afebrile. No events.    PAST MEDICAL & SURGICAL HISTORY:  No pertinent past medical history      No significant past surgical history      For details regarding the patient's social history, family history, and other miscellaneous elements, please refer the initial infectious diseases consultation and/or the admitting history and physical examination for this admission.    ROS:  CONSTITUTIONAL:  Negative fever or chills  EYES:  Negative  blurry vision or double vision  CARDIOVASCULAR:  Negative for chest pain or palpitations  RESPIRATORY:  Negative for cough, wheezing, or SOB   GASTROINTESTINAL:  Negative for nausea, vomiting, diarrhea, constipation, or abdominal pain  GENITOURINARY:  Negative frequency, urgency or dysuria  NEUROLOGIC:  No headache, confusion, dizziness, lightheadedness  All other systems were reviewed and are negative     No Known Allergies      Current inpatient medications :    ANTIBIOTICS/RELEVANT:    MEDICATIONS  (STANDING):  colchicine 0.6 milliGRAM(s) Oral two times a day  enoxaparin Injectable 40 milliGRAM(s) SubCutaneous every 24 hours  levothyroxine 25 MICROGram(s) Oral daily  midodrine. 5 milliGRAM(s) Oral three times a day  simvastatin 20 milliGRAM(s) Oral at bedtime    MEDICATIONS  (PRN):  acetaminophen     Tablet .. 650 milliGRAM(s) Oral every 6 hours PRN Temp greater or equal to 38C (100.4F), Mild Pain (1 - 3)  guaiFENesin Oral Liquid (Sugar-Free) 200 milliGRAM(s) Oral every 6 hours PRN Cough      Objective:  Vital Signs Last 24 Hrs  T(C): 36.7 (07-30-24 @ 13:44), Max: 36.7 (07-30-24 @ 13:44)  T(F): 98 (07-30-24 @ 13:44), Max: 98 (07-30-24 @ 13:44)  HR: 80 (07-30-24 @ 13:44) (59 - 82)  BP: 102/64 (07-30-24 @ 13:44) (102/64 - 151/75)  RR: 18 (07-30-24 @ 13:44) (18 - 18)  SpO2: 97% (07-30-24 @ 13:44) (93% - 97%)    Physical Exam:  General:  no acute distress  Neck: supple, trachea midline  Lungs: clear, no wheeze/rhonchi  Cardiovascular: regular rate and rhythm, S1 S2  Abdomen: soft, nontender,  bowel sounds normal  Neurological: alert and oriented x3  Skin: no rash  Extremities: Left knee swelling- decreased  no erythema no pain    LABS:                        10.5   15.10 )-----------( 286      ( 30 Jul 2024 07:52 )             32.8   07-29    140  |  103  |  23  ----------------------------<  144<H>  4.7   |  25  |  1.00    Ca    9.6      29 Jul 2024 16:00      Cell Count, Body Fluid (07.23.24 @ 20:50)    Tube Type: Lavender   Fluid Type: Other   Body Fluid Appearance: Turbid   Color - Body Fluid: Yellow   Total Nucleated Cell Count, Body Fluid: 30285: Reference Ranges:  Synovial Fluid  Total nucleated cells < 150 cells/uL  Neutrophils <25%  Lymphocytes 10-15%  Monocytes/Macrophages </=70%  Other parameters- No established reference ranges.  Bronchoalveolar lavage  Macrophages >85%  Lymphocytes 10-15%  Neutrophils <3%  Eosinophils <1%  Other parameters- No established reference ranges.  Peritoneal/pleural/pericardial fluid/other body fluid types  No established reference ranges. /uL   Total RBC Count: 50957 /uL   Neutrophils-Body Fluid: 60 %   BF Lymphocytes: 1 %   Monocyte/Macrophage Count - Body Fluid: 39 %    Culture - Joint (collected 23 Jul 2024 20:50)  Crystals, Synovial Fluid (07.23.24 @ 20:50)    Synovial Crystals Clarity: Turbid   Synovial Crystals Tube: Red Top Tube   Synovial Crystals Color: Yellow   Synovial Crystals Id: Crystals Present Intracellular CPPD (calcium pyrophosphate) present.      MICROBIOLOGY:    Source: Knee  Gram Stain (24 Jul 2024 20:52):    Moderate polymorphonuclear leukocytes per low power field    No organisms seen    Culture - Blood (collected 23 Jul 2024 16:58)  Source: .Blood Blood-Peripheral  Preliminary Report (25 Jul 2024 01:03):    No growth at 24 hours    Culture - Blood (collected 23 Jul 2024 16:58)  Source: .Blood Blood-Peripheral  Preliminary Report (25 Jul 2024 01:03):    No growth at 24 hours      RADIOLOGY & ADDITIONAL STUDIES:  ACC: [id number] EXAM:  CT KNEE ONLY LT   ORDERED BY: SAIRA EM     PROCEDURE DATE:  07/23/2024          INTERPRETATION:  INDICATION:  pain, swelling    TECHNIQUE: CT of the left knee without contrast with axial, sagittal, and   coronal multiplanar reformats.    Comparison:Knee radiographs dated 7/23/2024    FINDINGS:    No acute displaced fracture or dislocation. Tricompartmental knee   osteoarthritis with areas of chondrocalcinosis and joint bodies within   the suprapatellar recess of the knee joint space. Enthesopathic changes   at the quadriceps tendon and insertion and patellar tendon origin.   Nonspecific large knee joint effusion. Trace Stearns's cyst.    Atherosclerotic calcifications are noted. Mild subcutaneous edema about   the anterior knee.    IMPRESSION:    No acute displaced fracture or dislocation.    Nonspecific large knee joint effusion. If there is clinical concern for   septic arthritis of the knee, knee joint aspiration should be performed.    Tricompartmental knee osteoarthritis with areas of chondrocalcinosis and   joint bodies.      ACC: [id number] EXAM:  CT CHEST   ORDERED BY: SAIRA EM     PROCEDURE DATE:  07/23/2024          INTERPRETATION:  Clinical information: Cough.    CT scan of the chest was obtained without administration of intravenous   contrast.    No hilaror mediastinal adenopathy is noted.    Heart is normal in size. Calcification of the coronary arteries noted. No   pericardial effusion is noted.    No endobronchial lesions are noted. Lungs are clear. No pleural effusions   are noted.    Below the diaphragm, visualized portions of the abdomen are unremarkable.    Degenerative changes of the spine are noted.    IMPRESSION: There is no pneumonia.    Assessment :   89YO M PMH of HTN, Dyslipidemia, and Hypothyroidism resident of [facility] who presented to the hospital with c/o left knee swelling & pain associated with generalized weakness. Denies difficulty walking.  In ED he was found with fever of 103.2 F. Seen by Ortho, an arthrocentesis was performed.  Knee aspirate +Intracellular CPPD (calcium pyrophosphate) present- Likely pseudogout   Fluid culture NGTD  Septic joint ruled out  Rocha placed 7/25 sec AUR.  Rocha dc'd 7/28 Pt voiding  WBC better today  Clinically stable, doing well    Plan :   Trend temps and cbc  Supportive care  Pulm toileting  Monitor for retention  Asp precautions  Stable from ID standpoint    Continue with present regiment.  Appropriate use of antibiotics and adverse effects reviewed.      > 35 minutes were spent in direct patient care reviewing notes, medications ,labs data/ imaging , discussion with multidisciplinary team.    Thank you for allowing me to participate in care of your patient .    Holly Contreras MD  Infectious Disease  [phone]      [facility] patient examined and chart reviewed.    INTERVAL HPI/ OVERNIGHT EVENTS:   Afebrile. No events.    PAST MEDICAL & SURGICAL HISTORY:  No pertinent past medical history      No significant past surgical history      For details regarding the patient's social history, family history, and other miscellaneous elements, please refer the initial infectious diseases consultation and/or the admitting history and physical examination for this admission.    ROS:  CONSTITUTIONAL:  Negative fever or chills  EYES:  Negative  blurry vision or double vision  CARDIOVASCULAR:  Negative for chest pain or palpitations  RESPIRATORY:  Negative for cough, wheezing, or SOB   GASTROINTESTINAL:  Negative for nausea, vomiting, diarrhea, constipation, or abdominal pain  GENITOURINARY:  Negative frequency, urgency or dysuria  NEUROLOGIC:  No headache, confusion, dizziness, lightheadedness  All other systems were reviewed and are negative     No Known Allergies      Current inpatient medications :    ANTIBIOTICS/RELEVANT:    MEDICATIONS  (STANDING):  colchicine 0.6 milliGRAM(s) Oral two times a day  enoxaparin Injectable 40 milliGRAM(s) SubCutaneous every 24 hours  levothyroxine 25 MICROGram(s) Oral daily  midodrine. 5 milliGRAM(s) Oral three times a day  simvastatin 20 milliGRAM(s) Oral at bedtime    MEDICATIONS  (PRN):  acetaminophen     Tablet .. 650 milliGRAM(s) Oral every 6 hours PRN Temp greater or equal to 38C (100.4F), Mild Pain (1 - 3)  guaiFENesin Oral Liquid (Sugar-Free) 200 milliGRAM(s) Oral every 6 hours PRN Cough      Objective:  Vital Signs Last 24 Hrs  T(C): 36.7 (07-30-24 @ 13:44), Max: 36.7 (07-30-24 @ 13:44)  T(F): 98 (07-30-24 @ 13:44), Max: 98 (07-30-24 @ 13:44)  HR: 80 (07-30-24 @ 13:44) (59 - 82)  BP: 102/64 (07-30-24 @ 13:44) (102/64 - 151/75)  RR: 18 (07-30-24 @ 13:44) (18 - 18)  SpO2: 97% (07-30-24 @ 13:44) (93% - 97%)    Physical Exam:  General:  no acute distress  Neck: supple, trachea midline  Lungs: clear, no wheeze/rhonchi  Cardiovascular: regular rate and rhythm, S1 S2  Abdomen: soft, nontender,  bowel sounds normal  Neurological: alert and oriented x3  Skin: no rash  Extremities: Left knee swelling- decreased  no erythema no pain    LABS:                        10.5   15.10 )-----------( 286      ( 30 Jul 2024 07:52 )             32.8   07-29    140  |  103  |  23  ----------------------------<  144<H>  4.7   |  25  |  1.00    Ca    9.6      29 Jul 2024 16:00      Cell Count, Body Fluid (07.23.24 @ 20:50)    Tube Type: Lavender   Fluid Type: Other   Body Fluid Appearance: Turbid   Color - Body Fluid: Yellow   Total Nucleated Cell Count, Body Fluid: 56462: Reference Ranges:  Synovial Fluid  Total nucleated cells < 150 cells/uL  Neutrophils <25%  Lymphocytes 10-15%  Monocytes/Macrophages </=70%  Other parameters- No established reference ranges.  Bronchoalveolar lavage  Macrophages >85%  Lymphocytes 10-15%  Neutrophils <3%  Eosinophils <1%  Other parameters- No established reference ranges.  Peritoneal/pleural/pericardial fluid/other body fluid types  No established reference ranges. /uL   Total RBC Count: 14722 /uL   Neutrophils-Body Fluid: 60 %   BF Lymphocytes: 1 %   Monocyte/Macrophage Count - Body Fluid: 39 %    Culture - Joint (collected 23 Jul 2024 20:50)  Crystals, Synovial Fluid (07.23.24 @ 20:50)    Synovial Crystals Clarity: Turbid   Synovial Crystals Tube: Red Top Tube   Synovial Crystals Color: Yellow   Synovial Crystals Id: Crystals Present Intracellular CPPD (calcium pyrophosphate) present.      MICROBIOLOGY:    Source: Knee  Gram Stain (24 Jul 2024 20:52):    Moderate polymorphonuclear leukocytes per low power field    No organisms seen    Culture - Blood (collected 23 Jul 2024 16:58)  Source: .Blood Blood-Peripheral  Preliminary Report (25 Jul 2024 01:03):    No growth at 24 hours    Culture - Blood (collected 23 Jul 2024 16:58)  Source: .Blood Blood-Peripheral  Preliminary Report (25 Jul 2024 01:03):    No growth at 24 hours      RADIOLOGY & ADDITIONAL STUDIES:  ACC: [id number] EXAM:  CT KNEE ONLY LT   ORDERED BY: SAIRA EM     PROCEDURE DATE:  07/23/2024          INTERPRETATION:  INDICATION:  pain, swelling    TECHNIQUE: CT of the left knee without contrast with axial, sagittal, and   coronal multiplanar reformats.    Comparison:Knee radiographs dated 7/23/2024    FINDINGS:    No acute displaced fracture or dislocation. Tricompartmental knee   osteoarthritis with areas of chondrocalcinosis and joint bodies within   the suprapatellar recess of the knee joint space. Enthesopathic changes   at the quadriceps tendon and insertion and patellar tendon origin.   Nonspecific large knee joint effusion. Trace Stearns's cyst.    Atherosclerotic calcifications are noted. Mild subcutaneous edema about   the anterior knee.    IMPRESSION:    No acute displaced fracture or dislocation.    Nonspecific large knee joint effusion. If there is clinical concern for   septic arthritis of the knee, knee joint aspiration should be performed.    Tricompartmental knee osteoarthritis with areas of chondrocalcinosis and   joint bodies.      ACC: [id number] EXAM:  CT CHEST   ORDERED BY: SAIRA EM     PROCEDURE DATE:  07/23/2024          INTERPRETATION:  Clinical information: Cough.    CT scan of the chest was obtained without administration of intravenous   contrast.    No hilaror mediastinal adenopathy is noted.    Heart is normal in size. Calcification of the coronary arteries noted. No   pericardial effusion is noted.    No endobronchial lesions are noted. Lungs are clear. No pleural effusions   are noted.    Below the diaphragm, visualized portions of the abdomen are unremarkable.    Degenerative changes of the spine are noted.    IMPRESSION: There is no pneumonia.    Assessment :   88YO M PMH of HTN, Dyslipidemia, and Hypothyroidism resident of [facility] who presented to the hospital with c/o left knee swelling & pain associated with generalized weakness. Denies difficulty walking.  In ED he was found with fever of 103.2 F. Seen by Ortho, an arthrocentesis was performed.  Knee aspirate +Intracellular CPPD (calcium pyrophosphate) present- Likely pseudogout   Fluid culture NGTD  Septic joint ruled out  Rocha placed 7/25 sec AUR.  Rocha dc'd 7/28 Pt voiding  WBC better today  Clinically stable, doing well    Plan :   Check bladder scan  Trend temps and cbc  Supportive care  Pulm toileting  Monitor for retention  Asp precautions  Stable from ID standpoint    Continue with present regiment.  Appropriate use of antibiotics and adverse effects reviewed.      > 35 minutes were spent in direct patient care reviewing notes, medications ,labs data/ imaging , discussion with multidisciplinary team.    Thank you for allowing me to participate in care of your patient .    Holly Contreras MD  Infectious Disease  [phone]

## 2024-07-31 VITALS
DIASTOLIC BLOOD PRESSURE: 76 MMHG | HEART RATE: 59 BPM | RESPIRATION RATE: 17 BRPM | SYSTOLIC BLOOD PRESSURE: 130 MMHG | OXYGEN SATURATION: 96 % | TEMPERATURE: 98 F

## 2024-07-31 LAB
ANION GAP SERPL CALC-SCNC: 6 MMOL/L — SIGNIFICANT CHANGE UP (ref 5–17)
BUN SERPL-MCNC: 20 MG/DL — SIGNIFICANT CHANGE UP (ref 7–23)
CALCIUM SERPL-MCNC: 9 MG/DL — SIGNIFICANT CHANGE UP (ref 8.4–10.5)
CHLORIDE SERPL-SCNC: 105 MMOL/L — SIGNIFICANT CHANGE UP (ref 96–108)
CO2 SERPL-SCNC: 26 MMOL/L — SIGNIFICANT CHANGE UP (ref 22–31)
CREAT SERPL-MCNC: 0.78 MG/DL — SIGNIFICANT CHANGE UP (ref 0.5–1.3)
CULTURE RESULTS: SIGNIFICANT CHANGE UP
CULTURE RESULTS: SIGNIFICANT CHANGE UP
EGFR: 86 ML/MIN/1.73M2 — SIGNIFICANT CHANGE UP
GLUCOSE SERPL-MCNC: 104 MG/DL — HIGH (ref 70–99)
HCT VFR BLD CALC: 32 % — LOW (ref 39–50)
HGB BLD-MCNC: 10.3 G/DL — LOW (ref 13–17)
MAGNESIUM SERPL-MCNC: 2.2 MG/DL — SIGNIFICANT CHANGE UP (ref 1.6–2.6)
MCHC RBC-ENTMCNC: 29.3 PG — SIGNIFICANT CHANGE UP (ref 27–34)
MCHC RBC-ENTMCNC: 32.2 GM/DL — SIGNIFICANT CHANGE UP (ref 32–36)
MCV RBC AUTO: 91.2 FL — SIGNIFICANT CHANGE UP (ref 80–100)
NRBC # BLD: 0 /100 WBCS — SIGNIFICANT CHANGE UP (ref 0–0)
PLATELET # BLD AUTO: 171 K/UL — SIGNIFICANT CHANGE UP (ref 150–400)
POTASSIUM SERPL-MCNC: 4.2 MMOL/L — SIGNIFICANT CHANGE UP (ref 3.5–5.3)
POTASSIUM SERPL-SCNC: 4.2 MMOL/L — SIGNIFICANT CHANGE UP (ref 3.5–5.3)
RBC # BLD: 3.51 M/UL — LOW (ref 4.2–5.8)
RBC # FLD: 14.6 % — HIGH (ref 10.3–14.5)
SODIUM SERPL-SCNC: 137 MMOL/L — SIGNIFICANT CHANGE UP (ref 135–145)
SPECIMEN SOURCE: SIGNIFICANT CHANGE UP
SPECIMEN SOURCE: SIGNIFICANT CHANGE UP
WBC # BLD: 12.34 K/UL — HIGH (ref 3.8–10.5)
WBC # FLD AUTO: 12.34 K/UL — HIGH (ref 3.8–10.5)

## 2024-07-31 PROCEDURE — 83605 ASSAY OF LACTIC ACID: CPT

## 2024-07-31 PROCEDURE — 97530 THERAPEUTIC ACTIVITIES: CPT

## 2024-07-31 PROCEDURE — 80053 COMPREHEN METABOLIC PANEL: CPT

## 2024-07-31 PROCEDURE — 80048 BASIC METABOLIC PNL TOTAL CA: CPT

## 2024-07-31 PROCEDURE — 85652 RBC SED RATE AUTOMATED: CPT

## 2024-07-31 PROCEDURE — 87637 SARSCOV2&INF A&B&RSV AMP PRB: CPT

## 2024-07-31 PROCEDURE — 97116 GAIT TRAINING THERAPY: CPT

## 2024-07-31 PROCEDURE — 93005 ELECTROCARDIOGRAM TRACING: CPT

## 2024-07-31 PROCEDURE — 87070 CULTURE OTHR SPECIMN AEROBIC: CPT

## 2024-07-31 PROCEDURE — 0225U NFCT DS DNA&RNA 21 SARSCOV2: CPT

## 2024-07-31 PROCEDURE — 85730 THROMBOPLASTIN TIME PARTIAL: CPT

## 2024-07-31 PROCEDURE — 87040 BLOOD CULTURE FOR BACTERIA: CPT

## 2024-07-31 PROCEDURE — 83735 ASSAY OF MAGNESIUM: CPT

## 2024-07-31 PROCEDURE — 96367 TX/PROPH/DG ADDL SEQ IV INF: CPT

## 2024-07-31 PROCEDURE — 89051 BODY FLUID CELL COUNT: CPT

## 2024-07-31 PROCEDURE — 99285 EMERGENCY DEPT VISIT HI MDM: CPT

## 2024-07-31 PROCEDURE — 86140 C-REACTIVE PROTEIN: CPT

## 2024-07-31 PROCEDURE — 81001 URINALYSIS AUTO W/SCOPE: CPT

## 2024-07-31 PROCEDURE — 97110 THERAPEUTIC EXERCISES: CPT

## 2024-07-31 PROCEDURE — 97161 PT EVAL LOW COMPLEX 20 MIN: CPT

## 2024-07-31 PROCEDURE — 85027 COMPLETE CBC AUTOMATED: CPT

## 2024-07-31 PROCEDURE — 71045 X-RAY EXAM CHEST 1 VIEW: CPT

## 2024-07-31 PROCEDURE — 71250 CT THORAX DX C-: CPT | Mod: MC

## 2024-07-31 PROCEDURE — 36415 COLL VENOUS BLD VENIPUNCTURE: CPT

## 2024-07-31 PROCEDURE — 85610 PROTHROMBIN TIME: CPT

## 2024-07-31 PROCEDURE — 99239 HOSP IP/OBS DSCHRG MGMT >30: CPT

## 2024-07-31 PROCEDURE — 96365 THER/PROPH/DIAG IV INF INIT: CPT

## 2024-07-31 PROCEDURE — 83880 ASSAY OF NATRIURETIC PEPTIDE: CPT

## 2024-07-31 PROCEDURE — 73562 X-RAY EXAM OF KNEE 3: CPT

## 2024-07-31 PROCEDURE — 85025 COMPLETE CBC W/AUTO DIFF WBC: CPT

## 2024-07-31 PROCEDURE — 89060 EXAM SYNOVIAL FLUID CRYSTALS: CPT

## 2024-07-31 PROCEDURE — 82962 GLUCOSE BLOOD TEST: CPT

## 2024-07-31 PROCEDURE — 87205 SMEAR GRAM STAIN: CPT

## 2024-07-31 PROCEDURE — 73700 CT LOWER EXTREMITY W/O DYE: CPT | Mod: MC

## 2024-07-31 PROCEDURE — 81003 URINALYSIS AUTO W/O SCOPE: CPT

## 2024-07-31 PROCEDURE — 87075 CULTR BACTERIA EXCEPT BLOOD: CPT

## 2024-07-31 PROCEDURE — 87086 URINE CULTURE/COLONY COUNT: CPT

## 2024-07-31 RX ORDER — TAMSULOSIN HCL 0.4 MG
0.4 CAPSULE ORAL AT BEDTIME
Refills: 0 | Status: DISCONTINUED | OUTPATIENT
Start: 2024-07-31 | End: 2024-07-31

## 2024-07-31 RX ORDER — TAMSULOSIN HCL 0.4 MG
1 CAPSULE ORAL
Qty: 0 | Refills: 0 | DISCHARGE
Start: 2024-07-31

## 2024-07-31 RX ORDER — TRIAMTERENE/HYDROCHLOROTHIAZID 75 MG-50MG
1 TABLET ORAL
Refills: 0 | DISCHARGE

## 2024-07-31 RX ORDER — MIDODRINE HYDROCHLORIDE 2.5 MG/1
1 TABLET ORAL
Qty: 0 | Refills: 0 | DISCHARGE
Start: 2024-07-31

## 2024-07-31 RX ORDER — METOPROLOL TARTRATE 100 MG
1 TABLET ORAL
Refills: 0 | DISCHARGE

## 2024-07-31 RX ADMIN — Medication 25 MICROGRAM(S): at 05:14

## 2024-07-31 RX ADMIN — ENOXAPARIN SODIUM 40 MILLIGRAM(S): 120 INJECTION SUBCUTANEOUS at 11:58

## 2024-07-31 RX ADMIN — MIDODRINE HYDROCHLORIDE 5 MILLIGRAM(S): 2.5 TABLET ORAL at 11:58

## 2024-07-31 RX ADMIN — MIDODRINE HYDROCHLORIDE 5 MILLIGRAM(S): 2.5 TABLET ORAL at 05:15

## 2024-07-31 RX ADMIN — COLCHICINE 0.6 MILLIGRAM(S): 0.6 TABLET, FILM COATED ORAL at 05:15

## 2024-07-31 NOTE — PROGRESS NOTE ADULT - SUBJECTIVE AND OBJECTIVE BOX
Chief Complaint: Shortness of breath, cough    Interval Events: No events overnight.    Review of Systems:  General: No fevers, chills, weight gain  Skin: No rashes, color changes  Cardiovascular: No chest pain, orthopnea  Respiratory: No shortness of breath, cough  Gastrointestinal: No nausea, abdominal pain  Genitourinary: No incontinence, pain with urination  Musculoskeletal: No pain, swelling, decreased range of motion  Neurological: No headache, weakness  Psychiatric: No depression, anxiety  Endocrine: No weight gain, increased thirst  All other systems are comprehensively negative.    Physical Exam:  Vital Signs Last 24 Hrs  T(C): 36.4 (31 Jul 2024 05:07), Max: 36.8 (30 Jul 2024 20:45)  T(F): 97.6 (31 Jul 2024 05:07), Max: 98.3 (30 Jul 2024 20:45)  HR: 67 (31 Jul 2024 05:07) (61 - 83)  BP: 124/76 (31 Jul 2024 05:07) (102/64 - 151/75)  BP(mean): --  RR: 18 (31 Jul 2024 05:07) (18 - 18)  SpO2: 93% (31 Jul 2024 05:07) (93% - 97%)  Parameters below as of 31 Jul 2024 05:07  Patient On (Oxygen Delivery Method): room air  General: NAD  HEENT: MMM  Neck: No JVD, no carotid bruit  Lungs: CTAB  CV: RRR, nl S1/S2, no M/R/G  Abdomen: S/NT/ND, +BS  Extremities: No LE edema, no cyanosis  Neuro: AAOx3, non-focal  Skin: No rash    Labs:    07-31    137  |  105  |  20  ----------------------------<  104<H>  4.2   |  26  |  0.78    Ca    9.0      31 Jul 2024 06:15  Mg     2.2     07-31                          10.3   12.34 )-----------( 171      ( 31 Jul 2024 06:15 )             32.0     ECG/Telemetry: Sinus rhythm

## 2024-07-31 NOTE — PROGRESS NOTE ADULT - SUBJECTIVE AND OBJECTIVE BOX
[facility] examined and chart reviewed.    INTERVAL HPI/ OVERNIGHT EVENTS:   Afebrile. No events.    PAST MEDICAL & SURGICAL HISTORY:  No pertinent past medical history      No significant past surgical history      For details regarding the patient's social history, family history, and other miscellaneous elements, please refer the initial infectious diseases consultation and/or the admitting history and physical examination for this admission.    ROS:  CONSTITUTIONAL:  Negative fever or chills  EYES:  Negative  blurry vision or double vision  CARDIOVASCULAR:  Negative for chest pain or palpitations  RESPIRATORY:  Negative for cough, wheezing, or SOB   GASTROINTESTINAL:  Negative for nausea, vomiting, diarrhea, constipation, or abdominal pain  GENITOURINARY:  Negative frequency, urgency or dysuria  NEUROLOGIC:  No headache, confusion, dizziness, lightheadedness  All other systems were reviewed and are negative     No Known Allergies      Current inpatient medications :    ANTIBIOTICS/RELEVANT:    MEDICATIONS  (STANDING):  colchicine 0.6 milliGRAM(s) Oral two times a day  enoxaparin Injectable 40 milliGRAM(s) SubCutaneous every 24 hours  levothyroxine 25 MICROGram(s) Oral daily  midodrine. 5 milliGRAM(s) Oral three times a day  simvastatin 20 milliGRAM(s) Oral at bedtime  tamsulosin 0.4 milliGRAM(s) Oral at bedtime    MEDICATIONS  (PRN):  acetaminophen     Tablet .. 650 milliGRAM(s) Oral every 6 hours PRN Temp greater or equal to 38C (100.4F), Mild Pain (1 - 3)  guaiFENesin Oral Liquid (Sugar-Free) 200 milliGRAM(s) Oral every 6 hours PRN Cough        Objective:  Vital Signs Last 24 Hrs  T(C): 36.6 (31 Jul 2024 13:13), Max: 36.8 (30 Jul 2024 20:45)  T(F): 97.8 (31 Jul 2024 13:13), Max: 98.3 (30 Jul 2024 20:45)  HR: 59 (31 Jul 2024 13:13) (59 - 83)  BP: 130/76 (31 Jul 2024 13:13) (117/60 - 139/55)  RR: 17 (31 Jul 2024 13:13) (17 - 18)  SpO2: 96% (31 Jul 2024 13:13) (93% - 96%)    Parameters below as of 31 Jul 2024 05:07  Patient On (Oxygen Delivery Method): room air      Physical Exam:  General:  no acute distress  Neck: supple, trachea midline  Lungs: clear, no wheeze/rhonchi  Cardiovascular: regular rate and rhythm, S1 S2  Abdomen: soft, nontender,  bowel sounds normal  Neurological: alert and oriented x3  Skin: no rash  Extremities: Left knee swelling no erythema no pain    LABS:                        10.3   12.34 )-----------( 171      ( 31 Jul 2024 06:15 )             32.0   07-31    137  |  105  |  20  ----------------------------<  104<H>  4.2   |  26  |  0.78    Ca    9.0      31 Jul 2024 06:15  Mg     2.2     07-31      Cell Count, Body Fluid (07.23.24 @ 20:50)    Tube Type: Lavender   Fluid Type: Other   Body Fluid Appearance: Turbid   Color - Body Fluid: Yellow   Total Nucleated Cell Count, Body Fluid: 31023: Reference Ranges:  Synovial Fluid  Total nucleated cells < 150 cells/uL  Neutrophils <25%  Lymphocytes 10-15%  Monocytes/Macrophages </=70%  Other parameters- No established reference ranges.  Bronchoalveolar lavage  Macrophages >85%  Lymphocytes 10-15%  Neutrophils <3%  Eosinophils <1%  Other parameters- No established reference ranges.  Peritoneal/pleural/pericardial fluid/other body fluid types  No established reference ranges. /uL   Total RBC Count: 53284 /uL   Neutrophils-Body Fluid: 60 %   BF Lymphocytes: 1 %   Monocyte/Macrophage Count - Body Fluid: 39 %    Culture - Joint (collected 23 Jul 2024 20:50)  Crystals, Synovial Fluid (07.23.24 @ 20:50)    Synovial Crystals Clarity: Turbid   Synovial Crystals Tube: Red Top Tube   Synovial Crystals Color: Yellow   Synovial Crystals Id: Crystals Present Intracellular CPPD (calcium pyrophosphate) present.      MICROBIOLOGY:    Source: Knee  Gram Stain (24 Jul 2024 20:52):    Moderate polymorphonuclear leukocytes per low power field    No organisms seen    Culture - Blood (collected 23 Jul 2024 16:58)  Source: .Blood Blood-Peripheral  Preliminary Report (25 Jul 2024 01:03):    No growth at 24 hours    Culture - Blood (collected 23 Jul 2024 16:58)  Source: .Blood Blood-Peripheral  Preliminary Report (25 Jul 2024 01:03):    No growth at 24 hours      RADIOLOGY & ADDITIONAL STUDIES:  ACC: [id number] EXAM:  CT KNEE ONLY LT   ORDERED BY: SAIRA EM     PROCEDURE DATE:  07/23/2024          INTERPRETATION:  INDICATION:  pain, swelling    TECHNIQUE: CT of the left knee without contrast with axial, sagittal, and   coronal multiplanar reformats.    Comparison:Knee radiographs dated 7/23/2024    FINDINGS:    No acute displaced fracture or dislocation. Tricompartmental knee   osteoarthritis with areas of chondrocalcinosis and joint bodies within   the suprapatellar recess of the knee joint space. Enthesopathic changes   at the quadriceps tendon and insertion and patellar tendon origin.   Nonspecific large knee joint effusion. Trace Stearns's cyst.    Atherosclerotic calcifications are noted. Mild subcutaneous edema about   the anterior knee.    IMPRESSION:    No acute displaced fracture or dislocation.    Nonspecific large knee joint effusion. If there is clinical concern for   septic arthritis of the knee, knee joint aspiration should be performed.    Tricompartmental knee osteoarthritis with areas of chondrocalcinosis and   joint bodies.      ACC: [id number] EXAM:  CT CHEST   ORDERED BY: SAIRA EM     PROCEDURE DATE:  07/23/2024          INTERPRETATION:  Clinical information: Cough.    CT scan of the chest was obtained without administration of intravenous   contrast.    No hilaror mediastinal adenopathy is noted.    Heart is normal in size. Calcification of the coronary arteries noted. No   pericardial effusion is noted.    No endobronchial lesions are noted. Lungs are clear. No pleural effusions   are noted.    Below the diaphragm, visualized portions of the abdomen are unremarkable.    Degenerative changes of the spine are noted.    IMPRESSION: There is no pneumonia.    Assessment :   89YO M PMH of HTN, Dyslipidemia, and Hypothyroidism resident of [facility] who presented to the hospital with c/o left knee swelling & pain associated with generalized weakness. Denies difficulty walking.  In ED he was found with fever of 103.2 F. Seen by Ortho, an arthrocentesis was performed.  Knee aspirate +Intracellular CPPD (calcium pyrophosphate) present- Likely pseudogout   Fluid culture NGTD  Septic joint ruled out  Rocha placed 7/25 sec AUR.  Rocha dc'd 7/28 Pt voiding  Sp straight cath this am   pt voiding repeat bladder scan  no residual post void residual  WBC better today  Clinically stable, doing well    Plan :   Monitor off antibiotics  Trend temps and cbc  Supportive care  Pulm toileting  Monitor for retention  Asp precautions  Stable from ID standpoint  Dc to [facility] today    Continue with present regiment.  Appropriate use of antibiotics and adverse effects reviewed.      > 35 minutes were spent in direct patient care reviewing notes, medications ,labs data/ imaging , discussion with multidisciplinary team.    Thank you for allowing me to participate in care of your patient .    Holly Contreras MD  Infectious Disease  [phone]

## 2024-07-31 NOTE — DISCHARGE NOTE NURSING/CASE MANAGEMENT/SOCIAL WORK - NSDCFUADDAPPT_GEN_ALL_CORE_FT
Pt. will be seen by PCP Dr. Dial or Dr Brody at [facility] within 24-48 hours of return.

## 2024-07-31 NOTE — CASE MANAGEMENT PROGRESS NOTE - NSCMPROGRESSNOTE_GEN_ALL_CORE
Per treatment team rounds pt. Orthostatic BP improved and Pt. ambulating with RW with staff supervision and feeling better today.   Plan for return to Assisted living if they accept pt. back.   CM Teams message Dr. Cano for a 4449 c form and to contact son .          per treatment team rounds pt. Orthostatic BP improved and Pt. ambulating with RW with staff supervision and feeling better today.   Plan for return to Assisted living if they accept pt. back.   CM Teams message Dr. Cano for a 4449 c form and to contact son .      Pt  Seen by PT this afternoon and CM sent Fax of the PT note to [facility] [phone] [facility] for review.  CM called and left a message for NICOLE Nicholas at [facility] and she later returned call requesting a 1549C form.      MD completed form and CM faxed to [phone] and fax [phone] to [facility] about 1:50 pm.  MD spoke with son who is in agreement with plans and states he will transport pt home himself today.  NICOLE prepared an envelope leslie Wade to bring to [facility].      [facility] will visit within 24-72 hours of return and CM sent f2f   d/c notes requested start of care 8/1/24.    [facility] [phone] delivered Rolling walker to bedside today by liaison.  [facility] explained to son who is in agreement with home today  Called leslie Jordan [phone]  NICOLE following      Addendum :  Pt. approved for return to [facility] [phone] by NICOLE Herrmann.  Treatment team   and nurse Joni zheng.

## 2024-07-31 NOTE — DISCHARGE NOTE NURSING/CASE MANAGEMENT/SOCIAL WORK - PATIENT PORTAL LINK FT
You can access the FollowMyHealth Patient Portal offered by [facility] by registering at the following website: [email or web address]. By joining [employer]’s FollowMyHealth portal, you will also be able to view your health information using other applications (apps) compatible with our system.

## 2024-07-31 NOTE — CAREGIVER ENGAGEMENT NOTE - CAREGIVER EDUCATION - TYPES DISCUSSED
Discharge plan/Home Care Services/Post-acute care agency contact/Post-discharge escalation process
Discharge plan/DME/Home Care Services/Post-acute care agency contact/Post-discharge escalation process

## 2024-07-31 NOTE — PROGRESS NOTE ADULT - ASSESSMENT
The patient is an 89 year old male with a history of HTN, HL, hypothyroidism who presents with shortness of breath, cough.    Plan:  - ECG with sinus rhythm and atrial ectopy  - CT chest with no PNA  -   - Continue simvastatin 20 mg daily  - Remain off metoprolol and triamterene-HCTZ  - ID and ortho follow-up  - Arthrocentesis results consistent with pseudogout  - Syncope - occurring after bathroom use. Likely vasovagal in nature. However orthostatics also positive.  - Continue midodrine 5 mg tid

## 2024-07-31 NOTE — PROGRESS NOTE ADULT - REASON FOR ADMISSION
Left knee swelling & pain.

## 2024-07-31 NOTE — CAREGIVER ENGAGEMENT NOTE - CAREGIVER OUTREACH NOTES - FREE TEXT
per treatment team rounds pt. Orthostatic BP improved and Pt. ambulating with RW with staff supervision and feeling better today.   Plan for return to Assisted living if they accept pt. back.   CM Teams message Dr. Cano for a 2475 c form and to contact son .      Pt  Seen by PT this afternoon and CM sent Fax of the PT note to [facility] [phone] [facility] for review.  CM called and left a message for NICOLE Nicholas at [facility] and she later returned call requesting a 4790C form.      MD completed form and CM faxed to [phone] and fax [phone] to [facility] about 1:50 pm.  MD spoke with son who is in agreement with plans and states he will transport pt home himself today.  NICOLE prepared an envelope son Mauro to bring to [facility].      [facility] will visit within 24-72 hours of return and CM sent f2f   d/c notes requested start of care 8/1/24.    [facility] [phone] delivered Rolling walker to bedside today by liaison.  [facility] explained to son who is in agreement with home today  Called son Mauro Jordan [phone]  NICOLE following      Addendum :  Pt. approved for return to [facility] [phone] by NICOLE Herrmann.  Treatment team   and nurse Joni informed
   Ki director from [facility] [phone]  
left message for Alize   from [facility] [phone]

## 2024-07-31 NOTE — PROGRESS NOTE ADULT - SUBJECTIVE AND OBJECTIVE BOX
Date/Time Patient Seen:  		  Referring MD:   Data Reviewed	       Patient is a 89y old  Male who presents with a chief complaint of Left knee swelling & pain. (30 Jul 2024 14:31)      Subjective/HPI     PAST MEDICAL & SURGICAL HISTORY:  No pertinent past medical history    No significant past surgical history          Medication list         MEDICATIONS  (STANDING):  colchicine 0.6 milliGRAM(s) Oral two times a day  enoxaparin Injectable 40 milliGRAM(s) SubCutaneous every 24 hours  levothyroxine 25 MICROGram(s) Oral daily  midodrine. 5 milliGRAM(s) Oral three times a day  simvastatin 20 milliGRAM(s) Oral at bedtime    MEDICATIONS  (PRN):  acetaminophen     Tablet .. 650 milliGRAM(s) Oral every 6 hours PRN Temp greater or equal to 38C (100.4F), Mild Pain (1 - 3)  guaiFENesin Oral Liquid (Sugar-Free) 200 milliGRAM(s) Oral every 6 hours PRN Cough         Vitals log        ICU Vital Signs Last 24 Hrs  T(C): 36.4 (31 Jul 2024 05:07), Max: 36.8 (30 Jul 2024 20:45)  T(F): 97.6 (31 Jul 2024 05:07), Max: 98.3 (30 Jul 2024 20:45)  HR: 67 (31 Jul 2024 05:07) (61 - 83)  BP: 124/76 (31 Jul 2024 05:07) (102/64 - 151/75)  BP(mean): --  ABP: --  ABP(mean): --  RR: 18 (31 Jul 2024 05:07) (18 - 18)  SpO2: 93% (31 Jul 2024 05:07) (93% - 97%)    O2 Parameters below as of 31 Jul 2024 05:07  Patient On (Oxygen Delivery Method): room air                 Input and Output:  I&O's Detail    30 Jul 2024 07:01  -  31 Jul 2024 06:06  --------------------------------------------------------  IN:  Total IN: 0 mL    OUT:    Voided (mL): 550 mL  Total OUT: 550 mL    Total NET: -550 mL          Lab Data                        10.5   15.10 )-----------( 286      ( 30 Jul 2024 07:52 )             32.8     07-29    140  |  103  |  23  ----------------------------<  144<H>  4.7   |  25  |  1.00    Ca    9.6      29 Jul 2024 16:00              Review of Systems	      Objective     Physical Examination    heart s1s2  lung dc BS  head nc      Pertinent Lab findings & Imaging      Aj:  NO   Adequate UO     I&O's Detail    30 Jul 2024 07:01  -  31 Jul 2024 06:06  --------------------------------------------------------  IN:  Total IN: 0 mL    OUT:    Voided (mL): 550 mL  Total OUT: 550 mL    Total NET: -550 mL               Discussed with:     Cultures:	        Radiology                             No

## 2024-07-31 NOTE — PROGRESS NOTE ADULT - ASSESSMENT
89-year-old male brought in by ambulance from [facility] due to left knee pain and swelling.  Patient was found to have a fever in the ER.  Patient notes he has chronically pain to the left knee.  Patient reports experiencing a cough.    eval crystal arthropathy  eval septic joint  cough  OP  OA    on Midodrine  on colchicine  vs noted  pseudogout    ortho eval noted  pain assessment  cough rx regimen  cx - biomarkers  ct chest clear  knee imaging reviewed  monitor VS and Sat  GOC discussion

## 2024-07-31 NOTE — PROGRESS NOTE ADULT - PROVIDER SPECIALTY LIST ADULT
Cardiology
Hospitalist
Infectious Disease
Pulmonology
Cardiology
Hospitalist
Infectious Disease
Infectious Disease
Pulmonology
Pulmonology
Hospitalist
Infectious Disease
Infectious Disease
Orthopedics
Hospitalist
Pulmonology
Pulmonology

## 2024-07-31 NOTE — CHART NOTE - NSCHARTNOTEFT_GEN_A_CORE
Patient seen and examined. Confused and AO x1.  S/P Arthrocentesis and will follow up on fluid studies and cultures. Continue Antibiotics and follow up ID.
Assessment: Per HPI: This is an 88 y/o M with PMH of HTN, Dyslipidemia, and Hypothyroidism who presented from [facility] with left knee swelling & pain associated with generalized weakness. No reported fever or chills prior to presentation, denies knee trauma or falls, states that he has the above complaints "for a while". At the ED he was found with fever of 103.2 F with evidence of sepsis, seen by Ortho, an arthrocentesis was performed, and patient was started on antibiotics. Currently denies any pain. (2024 06:37)      Pt seen for nutrition follow-up. Per RN, presents with good appetite/po intake, consuming 100% of meals. No reported n/v/d/c, no noted BM, pt is forgetful. Pertinent medications/nutrition labs reviewed; Plan to c/w current diet ordered, encourage po intake as needed. RD to continue to monitor nutrition status per protocol.     Factors impacting intake: [x ] none [ ] nausea  [ ] vomiting [ ] diarrhea [ ] constipation  [ ]chewing problems [ ] swallowing issues  [ ] other:     Diet Prescription: Diet, Regular:   DASH/TLC {Sodium & Cholesterol Restricted} (24 @ 06:29)    Intake: good    Daily Weight in k.3 (2024 05:07)    % Weight Change    Pertinent Medications: MEDICATIONS  (STANDING):  colchicine 0.6 milliGRAM(s) Oral two times a day  enoxaparin Injectable 40 milliGRAM(s) SubCutaneous every 24 hours  levothyroxine 25 MICROGram(s) Oral daily  midodrine. 5 milliGRAM(s) Oral three times a day  simvastatin 20 milliGRAM(s) Oral at bedtime  tamsulosin 0.4 milliGRAM(s) Oral at bedtime    MEDICATIONS  (PRN):  acetaminophen     Tablet .. 650 milliGRAM(s) Oral every 6 hours PRN Temp greater or equal to 38C (100.4F), Mild Pain (1 - 3)  guaiFENesin Oral Liquid (Sugar-Free) 200 milliGRAM(s) Oral every 6 hours PRN Cough    Pertinent Labs:  Na137 mmol/L Glu 104 mg/dL<H> K+ 4.2 mmol/L Cr  0.78 mg/dL BUN 20 mg/dL  Alb 2.3 g/dL<L>     CAPILLARY BLOOD GLUCOSE      No edema or pressure injury documented per flowsheets.       Estimated Needs:   [ x] no change since previous assessment  [ ] recalculated:     Previous Nutrition Diagnosis:   [ ] Inadequate Energy Intake [ ]Inadequate Oral Intake [ ] Excessive Energy Intake   [ ] Underweight [ ] Increased Nutrient Needs [ ] Overweight/Obesity [x ] Altered Nutrition related lab values  [ ] Altered GI Function [ ] Unintended Weight Loss [ ] Food & Nutrition Related Knowledge Deficit [ ] Malnutrition     Nutrition Diagnosis is [x ] ongoing  [ ] resolved [ ] not applicable     New Nutrition Diagnosis: [x ] not applicable       Interventions: Plan to c/w current diet ordered, encourage po intake as needed. RD to continue to monitor nutrition status per protocol.   Recommend  [ ] Change Diet To:  [ ] Nutrition Supplement  [ ] Nutrition Support  [ ] Other:     Monitoring and Evaluation:   [X ] Intake [ x ] Tolerance to diet prescription [ x ] weights [ x ] labs[ x ] follow up per protocol  [ ] other:
Rapid response was called at 0720. Provider went to bedside and found patient in bed. Nurse reports patient went up to use toilet, and after stooling he pitched forward and was caught by nurse before he could fall. She reports he went briefly unresponsive, not responding to chest rubs and nurse splashed water on him. She estimates the length of time was 2 minutes. No head trauma and no shaking and no incontinence. Patient seen awake in bed, opens eyes to voice and is coherent. He denies complaints at this time and does not appear to be a good historian. Nurse added supplemental oxygen. EKG shows sinus rhythm with PVC. Patient reportedly took his AM meds and did not have breakfast yet. HR 55, /55, SpO2 97%    GENERAL: patient appears well, no acute distress, appropriate behavior  EYES: sclera clear, no exudates, PERRLA  ENMT: moist mucous membranes, oropharynx clear without erythema, no exudates  NECK: supple, soft, no thyromegaly noted  LUNGS:  clear to auscultation,  no rales, wheezing or rhonchi appreciated  HEART: S1/S2, regular rate and rhythm, no murmurs noted, no lower extremity edema appreciated  GASTROINTESTINAL: abdomen is soft, nontender, nondistended  INTEGUMENT: cool and clammy, scab on anterior L knee  MUSCULOSKELETAL: no clubbing or cyanosis, no obvious deformity  NEUROLOGIC: awake, alert, appears oriented, symmetric facies  PSYCHIATRIC: mood is good, affect is congruent, linear and logical thought process  HEME/LYMPH:  no obvious ecchymosis or petechiae      Assessment: Likely vasovagal syncope after using toilet. Bed rest for an hour, will plan for orthostatics. Monitor on tele.         Dr. Jose Almanza  Hospitalist

## 2024-07-31 NOTE — DISCHARGE NOTE NURSING/CASE MANAGEMENT/SOCIAL WORK - NSDCDMENAME_GEN_ALL_CORE_FT
[facility] [phone] delivered a Rolling walker to your bedside with copay $10.64

## 2024-07-31 NOTE — CAREGIVER ENGAGEMENT NOTE - CAREGIVER EDUCATION HOME CARE SERVICES - FREE TEXT
[facility] for physical therapy to start within 24-72 hours of gong home.  Pt. will see PCP at [facility] with 24-48 hours of going home

## 2024-07-31 NOTE — DISCHARGE NOTE NURSING/CASE MANAGEMENT/SOCIAL WORK - NSDCPEFALRISK_GEN_ALL_CORE
For information on Fall & Injury Prevention, visit: [email or web address] OR  [email or web address] OR  https://www.cdc.gov/steadi/patient.html

## 2024-10-12 ENCOUNTER — EMERGENCY (EMERGENCY)
Facility: HOSPITAL | Age: 89
LOS: 1 days | Discharge: NURSING, REHAB OR LONG-TERM CARE | End: 2024-10-12
Attending: STUDENT IN AN ORGANIZED HEALTH CARE EDUCATION/TRAINING PROGRAM | Admitting: STUDENT IN AN ORGANIZED HEALTH CARE EDUCATION/TRAINING PROGRAM
Payer: MEDICARE

## 2024-10-12 VITALS
SYSTOLIC BLOOD PRESSURE: 162 MMHG | HEIGHT: 70 IN | OXYGEN SATURATION: 98 % | RESPIRATION RATE: 18 BRPM | TEMPERATURE: 98 F | WEIGHT: 210.1 LBS | HEART RATE: 87 BPM | DIASTOLIC BLOOD PRESSURE: 81 MMHG

## 2024-10-12 VITALS
RESPIRATION RATE: 18 BRPM | TEMPERATURE: 98 F | OXYGEN SATURATION: 94 % | HEART RATE: 77 BPM | DIASTOLIC BLOOD PRESSURE: 85 MMHG | SYSTOLIC BLOOD PRESSURE: 135 MMHG

## 2024-10-12 LAB
ALBUMIN SERPL ELPH-MCNC: 2.8 G/DL — LOW (ref 3.3–5)
ALP SERPL-CCNC: 104 U/L — SIGNIFICANT CHANGE UP (ref 30–120)
ALT FLD-CCNC: <10 U/L — LOW (ref 10–60)
ANION GAP SERPL CALC-SCNC: 8 MMOL/L — SIGNIFICANT CHANGE UP (ref 5–17)
APPEARANCE UR: ABNORMAL
AST SERPL-CCNC: 32 U/L — SIGNIFICANT CHANGE UP (ref 10–40)
BACTERIA # UR AUTO: ABNORMAL /HPF
BASOPHILS # BLD AUTO: 0.03 K/UL — SIGNIFICANT CHANGE UP (ref 0–0.2)
BASOPHILS NFR BLD AUTO: 0.3 % — SIGNIFICANT CHANGE UP (ref 0–2)
BILIRUB SERPL-MCNC: 0.6 MG/DL — SIGNIFICANT CHANGE UP (ref 0.2–1.2)
BILIRUB UR-MCNC: NEGATIVE — SIGNIFICANT CHANGE UP
BUN SERPL-MCNC: 13 MG/DL — SIGNIFICANT CHANGE UP (ref 7–23)
CALCIUM SERPL-MCNC: 10 MG/DL — SIGNIFICANT CHANGE UP (ref 8.4–10.5)
CHLORIDE SERPL-SCNC: 100 MMOL/L — SIGNIFICANT CHANGE UP (ref 96–108)
CO2 SERPL-SCNC: 28 MMOL/L — SIGNIFICANT CHANGE UP (ref 22–31)
COLOR SPEC: YELLOW — SIGNIFICANT CHANGE UP
CREAT SERPL-MCNC: 0.92 MG/DL — SIGNIFICANT CHANGE UP (ref 0.5–1.3)
DIFF PNL FLD: NEGATIVE — SIGNIFICANT CHANGE UP
EGFR: 81 ML/MIN/1.73M2 — SIGNIFICANT CHANGE UP
EOSINOPHIL # BLD AUTO: 0.09 K/UL — SIGNIFICANT CHANGE UP (ref 0–0.5)
EOSINOPHIL NFR BLD AUTO: 0.9 % — SIGNIFICANT CHANGE UP (ref 0–6)
GLUCOSE SERPL-MCNC: 136 MG/DL — HIGH (ref 70–99)
GLUCOSE UR QL: NEGATIVE MG/DL — SIGNIFICANT CHANGE UP
HCT VFR BLD CALC: 40.3 % — SIGNIFICANT CHANGE UP (ref 39–50)
HGB BLD-MCNC: 12.6 G/DL — LOW (ref 13–17)
IMM GRANULOCYTES NFR BLD AUTO: 0.5 % — SIGNIFICANT CHANGE UP (ref 0–0.9)
KETONES UR-MCNC: NEGATIVE MG/DL — SIGNIFICANT CHANGE UP
LEUKOCYTE ESTERASE UR-ACNC: NEGATIVE — SIGNIFICANT CHANGE UP
LIDOCAIN IGE QN: 28 U/L — SIGNIFICANT CHANGE UP (ref 16–77)
LYMPHOCYTES # BLD AUTO: 1.09 K/UL — SIGNIFICANT CHANGE UP (ref 1–3.3)
LYMPHOCYTES # BLD AUTO: 10.9 % — LOW (ref 13–44)
MAGNESIUM SERPL-MCNC: 1.8 MG/DL — SIGNIFICANT CHANGE UP (ref 1.6–2.6)
MCHC RBC-ENTMCNC: 26.8 PG — LOW (ref 27–34)
MCHC RBC-ENTMCNC: 31.3 G/DL — LOW (ref 32–36)
MCV RBC AUTO: 85.6 FL — SIGNIFICANT CHANGE UP (ref 80–100)
MONOCYTES # BLD AUTO: 0.84 K/UL — SIGNIFICANT CHANGE UP (ref 0–0.9)
MONOCYTES NFR BLD AUTO: 8.4 % — SIGNIFICANT CHANGE UP (ref 2–14)
NEUTROPHILS # BLD AUTO: 7.93 K/UL — HIGH (ref 1.8–7.4)
NEUTROPHILS NFR BLD AUTO: 79 % — HIGH (ref 43–77)
NITRITE UR-MCNC: NEGATIVE — SIGNIFICANT CHANGE UP
NRBC # BLD: 0 /100 WBCS — SIGNIFICANT CHANGE UP (ref 0–0)
PH UR: 7.5 — SIGNIFICANT CHANGE UP (ref 5–8)
PLATELET # BLD AUTO: 292 K/UL — SIGNIFICANT CHANGE UP (ref 150–400)
POTASSIUM SERPL-MCNC: 3.5 MMOL/L — SIGNIFICANT CHANGE UP (ref 3.5–5.3)
POTASSIUM SERPL-SCNC: 3.5 MMOL/L — SIGNIFICANT CHANGE UP (ref 3.5–5.3)
PROT SERPL-MCNC: 7.1 G/DL — SIGNIFICANT CHANGE UP (ref 6–8.3)
PROT UR-MCNC: NEGATIVE MG/DL — SIGNIFICANT CHANGE UP
RBC # BLD: 4.71 M/UL — SIGNIFICANT CHANGE UP (ref 4.2–5.8)
RBC # FLD: 15.9 % — HIGH (ref 10.3–14.5)
RBC CASTS # UR COMP ASSIST: 0 /HPF — SIGNIFICANT CHANGE UP (ref 0–4)
SODIUM SERPL-SCNC: 136 MMOL/L — SIGNIFICANT CHANGE UP (ref 135–145)
SP GR SPEC: 1.01 — SIGNIFICANT CHANGE UP (ref 1–1.03)
UROBILINOGEN FLD QL: 1 MG/DL — SIGNIFICANT CHANGE UP (ref 0.2–1)
WBC # BLD: 10.03 K/UL — SIGNIFICANT CHANGE UP (ref 3.8–10.5)
WBC # FLD AUTO: 10.03 K/UL — SIGNIFICANT CHANGE UP (ref 3.8–10.5)
WBC UR QL: 0 /HPF — SIGNIFICANT CHANGE UP (ref 0–5)

## 2024-10-12 PROCEDURE — 71260 CT THORAX DX C+: CPT | Mod: 26,MC

## 2024-10-12 PROCEDURE — 72125 CT NECK SPINE W/O DYE: CPT | Mod: 26,MC

## 2024-10-12 PROCEDURE — 74177 CT ABD & PELVIS W/CONTRAST: CPT | Mod: 26,MC

## 2024-10-12 PROCEDURE — 99285 EMERGENCY DEPT VISIT HI MDM: CPT

## 2024-10-12 PROCEDURE — 70450 CT HEAD/BRAIN W/O DYE: CPT | Mod: 26,MC

## 2024-10-12 PROCEDURE — 93010 ELECTROCARDIOGRAM REPORT: CPT

## 2024-10-12 RX ORDER — SODIUM CHLORIDE IRRIG SOLUTION 0.9 %
1000 SOLUTION, IRRIGATION IRRIGATION ONCE
Refills: 0 | Status: COMPLETED | OUTPATIENT
Start: 2024-10-12 | End: 2024-10-12

## 2024-10-12 RX ADMIN — Medication 1000 MILLILITER(S): at 09:51

## 2024-10-12 NOTE — ED PROVIDER NOTE - PROGRESS NOTE DETAILS
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  Discussed results with family to gauge if they would want aggressive management or not.  They prefer discharge and they will follow-up through the [facility] cancer direct system and consider their options.

## 2024-10-12 NOTE — ED ADULT TRIAGE NOTE - CHIEF COMPLAINT QUOTE
sent from [facility], rolled out of bed unto the floor, c/o nausea and lightheadedness

## 2024-10-12 NOTE — ED PROVIDER NOTE - PATIENT PORTAL LINK FT
You can access the FollowMyHealth Patient Portal offered by [facility] by registering at the following website: [email or web address]. By joining [employer]’s FollowMyHealth portal, you will also be able to view your health information using other applications (apps) compatible with our system.

## 2024-10-12 NOTE — ED ADULT TRIAGE NOTE - TEMPERATURE IN FAHRENHEIT (DEGREES F)
Weight Management Medical Nutrition Assessment  Is here for initial RD visit for Healthy Core program (alternate)  Wife just started VLCD  Current wt: 239 2 lbs  Food recall reveals diet high in refined carbs, lower protein  Trouble after 8 p m  But wanting to start going to bed earlier  Feels he would like to use 2 replacements and bars as snacks until he gets some weight off  Has the equipment for exercise but currently not using  Not interested in food tracking  Anthropometric Measurements  Start Weight (lbs): 239 2 lbs   Ideal Body Weight (lbs): 184 lbs  Goal Weight (lbs): 200 lbs  Highest: 241 lbs, Lowest: 215 lbs    Weight Loss History  Previous weight loss attempts: High Protein/Low CHO diets (Atkins, Union, etc )    Food and Nutrition Related History  Wake up: 6:00  Breakfast: 7-7:30: sometimes yogurt OR Raisin bran, lactaid  Snack: skip  Lunch: 12-1: Pb&J s/w on whatever is there (white, wheat), pretzels or chips OR salad w/dressing, sometimes chicken if out  Snack: grazing chips, crackers, cookies  Dinner: 6 p m : 6 oz lean protein, ~3/4 cup carb, 1/2 cup veggies, sometimes bread w/butter 2 tsp  Snack: 8 p m  Snacking   Go to bed: 11-11:30  Beverages: water, diet soda and coffee/tea  Volume of beverage intake: >80 oz water, 2-5 cups coffee black  Weekends: Same, sometimes easier    Cravings: 8 p m  Frequency of Eating out: depends on schedule   Food restrictions: lactose intol to milk & ice cream  Cooking:wife  Food Shopping: wife    Physical Activity Intake  Activity: n/a  Frequency:never  Physical limitations/barriers to exercise: n/a    Estimated Needs  [place]: 2143x1  [id number]=2420  [facility] [employer] Needs: 1949, wt loss w/o exercise 7068-1310 calories  Protein: 100-125 gm (1 2-1 5g/kg IBW)  Fluid:98 oz (35mL/kg IBW)    Nutrition Diagnosis  Yes;     Overweight/obesity  related to Excess energy intake as evidenced by  BMI more than normative standard for age and sex (obesity-grade I 30-34  9)       Nutrition Intervention  Meal Plan  Breakfast: replacement  Snack: food snack or bar  Lunch: replacement  Snack: food snack or bar  Dinner: 6 oz lean pro, 2 carbs, veggies, 2 fat  Snack: food snack or bar    Nutrition Education: Healthy Core    Nutrition Counseling:  Strategies: meal planning, portion sizes, healthy snack choices, hydration, fiber intake, protein intake, exercise, food journal     Monitoring and Evaluation:  Evaluation criteria:   Portions: measuring carb  Dining out: making better choices  Exercise: evaluating day to determine where exercise can be incorporated    Barriers to learning:none  Readiness to change: Preparation  Comprehension: good  Expected Compliance: good 97.5

## 2024-10-12 NOTE — ED PROVIDER NOTE - PHYSICAL EXAMINATION
Vital signs as available reviewed.  General:  No acute distress.  Head:  Normocephalic, atraumatic.  Eyes:  Conjunctiva pink, no icterus.  Cardiovascular:  Regular rate, no obvious murmur.  Respiratory:  Clear to auscultation, good air entry bilaterally.  Abdomen:  Soft, + suprapubic / left lower quadrant tenderness to palpation.  Musculoskeletal:  No obvious deformity.  Neurologic: Alert, not oriented, moving all extremities.  Skin:  Warm and dry.

## 2024-10-12 NOTE — ED PROVIDER NOTE - IV ALTEPLASE EXCL REL HIDDEN
COLONOSCOPY      DIET:  Resume your usual diet.      ACTIVITY:  Rest quietly at home for the remainder of the day. You may resume normal activities tomorrow.  Do not do anything that requires coordination the day of the procedure, such as climbing ladders, using a sharp knife or operating machinery.   Do not drive until tomorrow morning.                       Resume normal medications.       WHAT TO EXPECT:  Mild abdominal discomfort, bloating and gas pains.     A scant amount of rectal bleeding following a biopsy, polypectomy or if you have hemorrhoids, is normal.    Return of your usual bowel movements in 1-2 days.    If  you had a polyp removed or biopsy performed, these specimens will be sent to the pathology laboratory.        Patient/Family given For your Well Being Teaching Sheet:  _x__ Care After Anesthesia/ Sedation  _x__ Pain Management Tips  ___ About Surgical Site infection  ___ Smoking and second hand Smoke information  ___ Other:       PROBLEMS TO WATCH FOR--NOTIFY YOUR SURGEON IF YOU HAVE ANY OF THESE SYMPTOMS:    Severe abdominal pain or bloating.     Chills or temperature over 101 degrees.    Large amount of bright red rectal bleeding, blood clots or black colored stool.    Vomiting blood or black colored liquid.           If you have any problems or questions concerning your surgical procedure, please do not hesitate to call your doctor's office or the day surgery dept [phone] (mon - fri)        Recommendations  Await pathology.  Repeat colonoscopy in 3 years for surveillance.  High fiber diet.   Avoid NSAIDs for 2 weeks.        Want to Say “Thank You” to a Nurse?  The [employer] Award® was created in memory of JOSH Coronel by his family to say thank you to bedside nurses who provide an outstanding level of care.  Submit a nomination using any method below.     OR    [email or web address]       show

## 2024-10-12 NOTE — ED PROVIDER NOTE - OBJECTIVE STATEMENT
89 M history of HTN, HLD, hypothyroid, orthostatic hypotension, vasovagal syncope, memory impairment. Brought in by EMS for evaluation after patient rolled out of bed. EMS report patient feels nausea, lightheaded which worse when standing. patient without complaint.

## 2024-10-12 NOTE — ED PROVIDER NOTE - CLINICAL SUMMARY MEDICAL DECISION MAKING FREE TEXT BOX
Here after fall from bed reported lightheadedness and nausea.  Left lower quadrant tenderness noted on exam.  Differential inclusive of injury from fall including fractures, intracranial hemorrhage, UTI, colitis, pyelonephritis, diverticulitis.  Check labs, hydration, treat symptoms as needed, CTs, EKG, reevaluate.

## 2024-10-12 NOTE — ED ADULT NURSE REASSESSMENT NOTE - NS ED NURSE REASSESS COMMENT FT1
spoke with case management  Alize and aware abd ct scan results and aware that family does not want anything done at this time and to follow up with pmd at facility.

## 2024-10-12 NOTE — ED ADULT NURSE NOTE - OBJECTIVE STATEMENT
found on floor today fell out of bed and vomited. pt c/o llq pain with palpation. pt awake alert and pleasantly confused skin warm and dry no resp distress lungs clear and equal b/l ascultation abd soft  tender llq region + bs . bladder distended. bed alarm checked and placed on pt.

## 2024-10-12 NOTE — ED ADULT NURSE NOTE - NSFALLHARMRISKINTERV_ED_ALL_ED
Assistance OOB with selected safe patient handling equipment if applicable/Communicate risk of Fall with Harm to all staff, patient, and family/Provide visual cue: red socks, yellow wristband, yellow gown, etc/Reinforce activity limits and safety measures with patient and family/Toileting schedule using arm’s reach rule for commode and bathroom/Bed in lowest position, wheels locked, appropriate side rails in place/Call bell, personal items and telephone in reach/Instruct patient to call for assistance before getting out of bed/chair/stretcher/Non-slip footwear applied when patient is off stretcher/[place] to call system/Physically safe environment - no spills, clutter or unnecessary equipment/Purposeful Proactive Rounding/Room/bathroom lighting operational, light cord in reach

## 2024-10-12 NOTE — ED PROVIDER NOTE - CARE PLAN
Principal Discharge DX:	Fall  Secondary Diagnosis:	Bladder mass  Secondary Diagnosis:	Fracture of thoracic vertebra, compression   1

## 2024-10-12 NOTE — ED PROVIDER NOTE - NSFOLLOWUPINSTRUCTIONS_ED_ALL_ED_FT
Please follow up with your Primary Care Physician and any specialists as discussed.  You information given to the [facility] Cancer Direct Program who should reach out to you.   Please take your medications as prescribed and or instructed.  If your symptoms persist or worsen, please seek care. Either return to the Emergency Department, go to urgent care or see your primary care doctor.  Please refer to general information and instructions attached or below:     Fall Prevention    WHAT YOU NEED TO KNOW:    Fall prevention includes ways to make your home and other areas safer. It also includes ways you can move more carefully to prevent a fall. Health conditions that cause changes in your blood pressure, vision, or muscle strength and coordination may increase your risk for falls. Medicines may also increase your risk for falls if they make you dizzy, weak, or sleepy.     DISCHARGE INSTRUCTIONS:    Call 911 or have someone else call if:     You have fallen and are unconscious.      You have fallen and cannot move part of your body.    Contact your healthcare provider if:     You have fallen and have pain or a headache.      You have questions or concerns about your condition or care.    Fall prevention tips:     Stand or sit up slowly. This may help you keep your balance and prevent falls.      Use assistive devices as directed. Your healthcare provider may suggest that you use a cane or walker to help you keep your balance. You may need to have grab bars put in your bathroom near the toilet or in the shower.      Wear shoes that fit well and have soles that . Wear shoes both inside and outside. Use slippers with good . Do not wear shoes with high heels.      Wear a personal alarm. This is a device that allows you to call 911 if you fall and need help. Ask your healthcare provider for more information.      Stay active. Exercise can help strengthen your muscles and improve your balance. Your healthcare provider may recommend water aerobics or walking. He or she may also recommend physical therapy to improve your coordination. Never start an exercise program without talking to your healthcare provider first.       Manage your medical conditions. Keep all appointments with your healthcare providers. Visit your eye doctor as directed.           Home safety tips:     Add items to prevent falls in the bathroom. Put nonslip strips on your bath or shower floor to prevent you from slipping. Use a bath mat if you do not have carpet in the bathroom. This will prevent you from falling when you step out of the bath or shower. Use a shower seat so you do not need to stand while you shower. Sit on the toilet or a chair in your bathroom to dry yourself and put on clothing. This will prevent you from losing your balance from drying or dressing yourself while you are standing.       Keep paths clear. Remove books, shoes, and other objects from walkways and stairs. Place cords for telephones and lamps out of the way so that you do not need to walk over them. Tape them down if you cannot move them. Remove small rugs. If you cannot remove a rug, secure it with double-sided tape. This will prevent you from tripping.       Install bright lights in your home. Use night lights to help light paths to the bathroom or kitchen. Always turn on the light before you start walking.      Keep items you use often on shelves within reach. Do not use a step stool to help you reach an item.      Paint or place reflective tape on the edges of your stairs. This will help you see the stairs better.    Follow up with your healthcare provider as directed: Write down your questions so you remember to ask them during your visits.

## 2024-10-12 NOTE — ED ADULT NURSE NOTE - CHIEF COMPLAINT QUOTE
sent from [facility], rolled out of bed unto the floor, c/o nausea and lightheadedness

## 2024-10-14 ENCOUNTER — NON-APPOINTMENT (OUTPATIENT)
Age: 89
End: 2024-10-14

## 2024-10-17 PROBLEM — R41.3 OTHER AMNESIA: Chronic | Status: ACTIVE | Noted: 2024-10-12

## 2024-10-17 PROBLEM — Z00.00 ENCOUNTER FOR PREVENTIVE HEALTH EXAMINATION: Status: ACTIVE | Noted: 2024-10-17

## 2024-10-25 ENCOUNTER — APPOINTMENT (OUTPATIENT)
Dept: UROLOGY | Facility: CLINIC | Age: 89
End: 2024-10-25
Payer: MEDICARE

## 2024-10-25 ENCOUNTER — NON-APPOINTMENT (OUTPATIENT)
Age: 89
End: 2024-10-25

## 2024-10-25 VITALS
BODY MASS INDEX: 31.5 KG/M2 | SYSTOLIC BLOOD PRESSURE: 158 MMHG | RESPIRATION RATE: 16 BRPM | HEART RATE: 96 BPM | WEIGHT: 220 LBS | DIASTOLIC BLOOD PRESSURE: 77 MMHG | TEMPERATURE: 97.2 F | HEIGHT: 70 IN

## 2024-10-25 DIAGNOSIS — N32.89 OTHER SPECIFIED DISORDERS OF BLADDER: ICD-10-CM

## 2024-10-25 PROCEDURE — 99204 OFFICE O/P NEW MOD 45 MIN: CPT

## 2024-10-25 RX ORDER — OMEPRAZOLE 20 MG/1
20 TABLET, DELAYED RELEASE ORAL
Refills: 0 | Status: ACTIVE | COMMUNITY

## 2024-10-25 RX ORDER — SIMVASTATIN 20 MG/1
20 TABLET, FILM COATED ORAL
Refills: 0 | Status: ACTIVE | COMMUNITY

## 2024-10-25 RX ORDER — NAPROXEN 500 MG/1
500 TABLET ORAL
Refills: 0 | Status: ACTIVE | COMMUNITY

## 2024-10-25 RX ORDER — LEVOTHYROXINE SODIUM 300 UG/1
TABLET ORAL
Refills: 0 | Status: ACTIVE | COMMUNITY

## 2024-10-29 DIAGNOSIS — A49.9 URINARY TRACT INFECTION, SITE NOT SPECIFIED: ICD-10-CM

## 2024-10-29 DIAGNOSIS — N39.0 URINARY TRACT INFECTION, SITE NOT SPECIFIED: ICD-10-CM

## 2024-10-29 LAB
BACTERIA UR CULT: ABNORMAL
URINE CYTOLOGY: NORMAL

## 2024-10-29 RX ORDER — CIPROFLOXACIN HYDROCHLORIDE 500 MG/1
500 TABLET, FILM COATED ORAL TWICE DAILY
Qty: 14 | Refills: 0 | Status: ACTIVE | COMMUNITY
Start: 2024-10-29 | End: 1900-01-01

## 2024-11-20 PROCEDURE — 71260 CT THORAX DX C+: CPT | Mod: MC

## 2024-11-20 PROCEDURE — 85025 COMPLETE CBC W/AUTO DIFF WBC: CPT

## 2024-11-20 PROCEDURE — 83735 ASSAY OF MAGNESIUM: CPT

## 2024-11-20 PROCEDURE — 36415 COLL VENOUS BLD VENIPUNCTURE: CPT

## 2024-11-20 PROCEDURE — 99285 EMERGENCY DEPT VISIT HI MDM: CPT | Mod: 25

## 2024-11-20 PROCEDURE — 81001 URINALYSIS AUTO W/SCOPE: CPT

## 2024-11-20 PROCEDURE — 74177 CT ABD & PELVIS W/CONTRAST: CPT | Mod: MC

## 2024-11-20 PROCEDURE — 93005 ELECTROCARDIOGRAM TRACING: CPT

## 2024-11-20 PROCEDURE — 83690 ASSAY OF LIPASE: CPT

## 2024-11-20 PROCEDURE — 70450 CT HEAD/BRAIN W/O DYE: CPT | Mod: MC

## 2024-11-20 PROCEDURE — 72125 CT NECK SPINE W/O DYE: CPT | Mod: MC

## 2024-11-20 PROCEDURE — 80053 COMPREHEN METABOLIC PANEL: CPT

## 2024-12-02 RX ORDER — AMOXICILLIN AND CLAVULANATE POTASSIUM 875; 125 MG/1; MG/1
875-125 TABLET, COATED ORAL
Qty: 10 | Refills: 0 | Status: ACTIVE | COMMUNITY
Start: 2024-12-02 | End: 1900-01-01

## 2024-12-09 ENCOUNTER — TRANSCRIPTION ENCOUNTER (OUTPATIENT)
Age: 89
End: 2024-12-09

## 2024-12-09 ENCOUNTER — RESULT REVIEW (OUTPATIENT)
Age: 89
End: 2024-12-09

## 2024-12-09 ENCOUNTER — APPOINTMENT (OUTPATIENT)
Dept: UROLOGY | Facility: AMBULATORY SURGERY CENTER | Age: 89
End: 2024-12-09

## 2024-12-09 ENCOUNTER — OUTPATIENT (OUTPATIENT)
Dept: OUTPATIENT SERVICES | Facility: HOSPITAL | Age: 89
LOS: 1 days | Discharge: HOME | End: 2024-12-09
Payer: MEDICARE

## 2024-12-09 VITALS
WEIGHT: 205.25 LBS | OXYGEN SATURATION: 95 % | TEMPERATURE: 98 F | DIASTOLIC BLOOD PRESSURE: 95 MMHG | SYSTOLIC BLOOD PRESSURE: 158 MMHG | HEART RATE: 83 BPM | RESPIRATION RATE: 20 BRPM

## 2024-12-09 VITALS
OXYGEN SATURATION: 96 % | RESPIRATION RATE: 18 BRPM | DIASTOLIC BLOOD PRESSURE: 84 MMHG | HEART RATE: 82 BPM | SYSTOLIC BLOOD PRESSURE: 147 MMHG

## 2024-12-09 DIAGNOSIS — N32.89 OTHER SPECIFIED DISORDERS OF BLADDER: ICD-10-CM

## 2024-12-09 PROCEDURE — 88307 TISSUE EXAM BY PATHOLOGIST: CPT | Mod: 26

## 2024-12-09 PROCEDURE — 88342 IMHCHEM/IMCYTCHM 1ST ANTB: CPT | Mod: 26

## 2024-12-09 PROCEDURE — 88341 IMHCHEM/IMCYTCHM EA ADD ANTB: CPT | Mod: 26

## 2024-12-09 PROCEDURE — 52235 CYSTOSCOPY AND TREATMENT: CPT

## 2024-12-09 RX ORDER — AMOXICILLIN/POTASSIUM CLAV 250-125 MG
875 TABLET ORAL
Qty: 10 | Refills: 0
Start: 2024-12-09 | End: 2024-12-13

## 2024-12-09 RX ORDER — AMOXICILLIN/POTASSIUM CLAV 250-125 MG
500 TABLET ORAL
Qty: 10 | Refills: 0
Start: 2024-12-09 | End: 2024-12-13

## 2024-12-09 RX ORDER — PHENAZOPYRIDINE HCL 200 MG
1 TABLET ORAL
Qty: 6 | Refills: 0
Start: 2024-12-09 | End: 2024-12-10

## 2024-12-09 RX ORDER — GEMCITABINE 10 MG/ML
1000 INJECTION, SOLUTION INTRAVENOUS ONCE
Refills: 0 | Status: DISCONTINUED | OUTPATIENT
Start: 2024-12-09 | End: 2024-12-23

## 2024-12-09 NOTE — ASU DISCHARGE PLAN (ADULT/PEDIATRIC) - ASU DC SPECIAL INSTRUCTIONSFT
GENERAL: It is common to have some blood in your urine after your procedure. Inform your doctor if you have a significant amount of clot in the urine or if you are unable to void at all.   BATHING: You may shower or bathe.  DIET: You may resume your regular diet and regular medication regimen.  PAIN: You may take Tylenol (acetaminophen) 650-975mg and/or Motrin (ibuprofen) 400-600mg, both available over the counter, for pain every 6 hours as needed. Do not exceed 4000mg of Tylenol (acetaminophen) daily. You may alternate these medications such that you take one or the other every 3 hours for around the clock pain coverage. Pyridium 200 mg, take 3 times a day for 2 days.   ANTIBIOTICS: You may be given a prescription for an antibiotic, please take this medication as instructed and be sure to complete the entire course.  STOOL SOFTENERS: Do not allow yourself to become constipated as straining may cause bleeding. Take stool softeners or a laxative (ex. Miralax, Colace, Senokot, ExLax, etc), available over the counter, if needed.  ACTIVITY: No heavy lifting or strenuous exercise until you are evaluated at your post-operative appointment. Otherwise, you may return to your usual level of physical activity.    FOLLOW-UP: If you did not already schedule your post-operative appointment, please call your urologist to schedule and follow-up appointment. Dr. Phan will call with pathology results.     CALL YOUR UROLOGIST IF: You have any bleeding that does not stop, inability to void >8 hours, fever over 100.4 F, chills, persistent nausea/vomiting, changes in your incision concerning for infection, or if your pain is not controlled on your discharge pain medications.

## 2024-12-09 NOTE — ASU DISCHARGE PLAN (ADULT/PEDIATRIC) - NS MD DC FALL RISK RISK
For information on Fall & Injury Prevention, visit: [email or web address] OR  [email or web address] OR  https://www.cdc.gov/steadi/patient.html

## 2024-12-09 NOTE — ASU DISCHARGE PLAN (ADULT/PEDIATRIC) - CALL YOUR DOCTOR IF YOU HAVE ANY OF THE FOLLOWING:
Fever greater than (need to indicate Fahrenheit or Celsius) Bleeding that does not stop/Swelling that gets worse/Pain not relieved by Medications/Fever greater than (need to indicate Fahrenheit or Celsius)/Wound/Surgical Site with redness, or foul smelling discharge or pus/Nausea and vomiting that does not stop/Unable to urinate/Inability to tolerate liquids or foods

## 2024-12-09 NOTE — BRIEF OPERATIVE NOTE - DISPOSITION
Alert-The patient is alert, awake and responds to voice. The patient is oriented to time, place, and person. The triage nurse is able to obtain subjective information.
PACU then home

## 2024-12-09 NOTE — ASU DISCHARGE PLAN (ADULT/PEDIATRIC) - CARE PROVIDER_API CALL
Abisai Phan  Urology  [address] [place] [address], [address] [address]  Phone: [phone]  Fax: [phone]  Follow Up Time:

## 2024-12-09 NOTE — ASU PREOP CHECKLIST - SITE MARKED BY SURGEON
Dennie Bevels approval     PA Case: [id number], Status: Approved, Coverage Starts on: 4/25/2022 12:00:00 AM, Coverage Ends on: 4/25/2023 12:00:00 AM.    Faxed to [employer] n/a

## 2024-12-09 NOTE — ASU DISCHARGE PLAN (ADULT/PEDIATRIC) - FINANCIAL ASSISTANCE
[facility] provides services at a reduced cost to those who are determined to be eligible through [facility]’s financial assistance program. Information regarding [facility]’s financial assistance program can be found by going to [email or web address] or by calling [phone].

## 2024-12-11 LAB
CULTURE RESULTS: SIGNIFICANT CHANGE UP
SPECIMEN SOURCE: SIGNIFICANT CHANGE UP

## 2024-12-17 LAB — SURGICAL PATHOLOGY STUDY: SIGNIFICANT CHANGE UP

## (undated) DEVICE — ELCTR GROUNDING PAD ADULT COVIDIEN

## (undated) DEVICE — ELCTR CUTTING LOOP 24FR RIGHT ANGLE

## (undated) DEVICE — TUBING TUR 2 PRONG

## (undated) DEVICE — SOL IRR POUR H2O 500ML

## (undated) DEVICE — DRAINAGE BAG URINARY 4000CC

## (undated) DEVICE — SOL IRR BAG NS 0.9% 3000ML

## (undated) DEVICE — VENODYNE/SCD SLEEVE CALF MEDIUM

## (undated) DEVICE — FOLEY CATH 2-WAY 18FR 5CC LATEX HYDROGEL

## (undated) DEVICE — POSITIONER PATIENT SAFETY STRAP 3X60"

## (undated) DEVICE — SOL IRR BAG H2O 3000ML

## (undated) DEVICE — CABLE DAC ACTIVE CORD

## (undated) DEVICE — CAM-ESU 1501230: Type: DURABLE MEDICAL EQUIPMENT

## (undated) DEVICE — GLV 7.5 PROTEXIS (WHITE)

## (undated) DEVICE — WARMING BLANKET UPPER ADULT

## (undated) DEVICE — POSITIONER FOAM EGG CRATE ULNAR 2PCS (PINK)

## (undated) DEVICE — PACK CYSTO

## (undated) DEVICE — LABELS BLANK W PEN